# Patient Record
Sex: FEMALE | Race: BLACK OR AFRICAN AMERICAN | Employment: UNEMPLOYED | URBAN - METROPOLITAN AREA
[De-identification: names, ages, dates, MRNs, and addresses within clinical notes are randomized per-mention and may not be internally consistent; named-entity substitution may affect disease eponyms.]

---

## 2017-03-31 ENCOUNTER — TELEPHONE (OUTPATIENT)
Dept: FAMILY MEDICINE CLINIC | Facility: CLINIC | Age: 27
End: 2017-03-31

## 2022-06-02 ENCOUNTER — HOSPITAL ENCOUNTER (EMERGENCY)
Age: 32
Discharge: HOME | End: 2022-06-02
Attending: EMERGENCY MEDICINE
Payer: MEDICAID

## 2022-06-02 VITALS
OXYGEN SATURATION: 100 % | TEMPERATURE: 97.2 F | HEIGHT: 67 IN | BODY MASS INDEX: 30.55 KG/M2 | WEIGHT: 194.67 LBS | RESPIRATION RATE: 21 BRPM | DIASTOLIC BLOOD PRESSURE: 88 MMHG | SYSTOLIC BLOOD PRESSURE: 118 MMHG | HEART RATE: 84 BPM

## 2022-06-02 DIAGNOSIS — T78.40XA ALLERGIC REACTION, INITIAL ENCOUNTER: Primary | ICD-10-CM

## 2022-06-02 PROCEDURE — 6360000002 HC RX W HCPCS: Performed by: PHYSICIAN ASSISTANT

## 2022-06-02 PROCEDURE — 2500000003 HC RX 250 WO HCPCS: Performed by: PHYSICIAN ASSISTANT

## 2022-06-02 PROCEDURE — 99284 EMERGENCY DEPT VISIT MOD MDM: CPT

## 2022-06-02 PROCEDURE — 96372 THER/PROPH/DIAG INJ SC/IM: CPT

## 2022-06-02 PROCEDURE — 96375 TX/PRO/DX INJ NEW DRUG ADDON: CPT

## 2022-06-02 PROCEDURE — 2580000003 HC RX 258: Performed by: PHYSICIAN ASSISTANT

## 2022-06-02 PROCEDURE — 96374 THER/PROPH/DIAG INJ IV PUSH: CPT

## 2022-06-02 PROCEDURE — A4216 STERILE WATER/SALINE, 10 ML: HCPCS | Performed by: PHYSICIAN ASSISTANT

## 2022-06-02 RX ORDER — EPINEPHRINE 1 MG/ML
0.3 INJECTION, SOLUTION, CONCENTRATE INTRAVENOUS ONCE
Status: COMPLETED | OUTPATIENT
Start: 2022-06-02 | End: 2022-06-02

## 2022-06-02 RX ORDER — METHYLPREDNISOLONE SODIUM SUCCINATE 125 MG/2ML
125 INJECTION, POWDER, LYOPHILIZED, FOR SOLUTION INTRAMUSCULAR; INTRAVENOUS ONCE
Status: COMPLETED | OUTPATIENT
Start: 2022-06-02 | End: 2022-06-02

## 2022-06-02 RX ORDER — FAMOTIDINE 20 MG/1
20 TABLET, FILM COATED ORAL 2 TIMES DAILY
Qty: 10 TABLET | Refills: 0 | Status: SHIPPED | OUTPATIENT
Start: 2022-06-02 | End: 2022-08-03

## 2022-06-02 RX ORDER — ONDANSETRON 2 MG/ML
4 INJECTION INTRAMUSCULAR; INTRAVENOUS ONCE
Status: COMPLETED | OUTPATIENT
Start: 2022-06-02 | End: 2022-06-02

## 2022-06-02 RX ORDER — CETIRIZINE HYDROCHLORIDE 10 MG/1
10 TABLET ORAL DAILY
Qty: 30 TABLET | Refills: 0 | Status: SHIPPED | OUTPATIENT
Start: 2022-06-02 | End: 2022-07-02

## 2022-06-02 RX ORDER — EPINEPHRINE 0.3 MG/.3ML
0.3 INJECTION SUBCUTANEOUS ONCE
Qty: 0.3 ML | Refills: 0 | Status: SHIPPED | OUTPATIENT
Start: 2022-06-02 | End: 2022-08-03

## 2022-06-02 RX ORDER — METHYLPREDNISOLONE 4 MG/1
TABLET ORAL
Qty: 1 KIT | Refills: 0 | Status: SHIPPED | OUTPATIENT
Start: 2022-06-02 | End: 2022-06-08

## 2022-06-02 RX ORDER — DIPHENHYDRAMINE HYDROCHLORIDE 50 MG/ML
25 INJECTION INTRAMUSCULAR; INTRAVENOUS ONCE
Status: COMPLETED | OUTPATIENT
Start: 2022-06-02 | End: 2022-06-02

## 2022-06-02 RX ADMIN — METHYLPREDNISOLONE SODIUM SUCCINATE 125 MG: 125 INJECTION, POWDER, FOR SOLUTION INTRAMUSCULAR; INTRAVENOUS at 14:20

## 2022-06-02 RX ADMIN — FAMOTIDINE 20 MG: 10 INJECTION, SOLUTION INTRAVENOUS at 14:21

## 2022-06-02 RX ADMIN — EPINEPHRINE 0.3 MG: 1 INJECTION, SOLUTION, CONCENTRATE INTRAVENOUS at 14:36

## 2022-06-02 RX ADMIN — DIPHENHYDRAMINE HYDROCHLORIDE 25 MG: 50 INJECTION, SOLUTION INTRAMUSCULAR; INTRAVENOUS at 14:20

## 2022-06-02 RX ADMIN — ONDANSETRON 4 MG: 2 INJECTION INTRAMUSCULAR; INTRAVENOUS at 14:31

## 2022-06-02 ASSESSMENT — ENCOUNTER SYMPTOMS
CHEST TIGHTNESS: 0
SHORTNESS OF BREATH: 0
GASTROINTESTINAL NEGATIVE: 1
TROUBLE SWALLOWING: 1

## 2022-06-02 ASSESSMENT — PAIN - FUNCTIONAL ASSESSMENT: PAIN_FUNCTIONAL_ASSESSMENT: NONE - DENIES PAIN

## 2022-06-02 NOTE — ED NOTES
Patient was given discharge instructions and voices understanding. Patient is to return to ED with any concerning symptoms or needs.           Fabian Tabor RN  06/02/22 9287

## 2022-06-02 NOTE — ED PROVIDER NOTES
[address]  [address] [address]  Dept: [phone]  Loc: [phone]  eMERGENCYdEPARTMENT eNCOUnter      Pt Name: Nanci Byrd  MRN: [id number]  [place] 1990  Date of evaluation: 6/2/2022  Provider:Althea Myers PA-C    CHIEF COMPLAINT       Chief Complaint   Patient presents with    Allergic Reaction     pt states woke up itching all over body. noticed swelling/redness. states throat itching now. took teaspoon of benedryl with no relief. pt states no new medications/detergants or food        CRITICAL CARE TIME   Total Critical Care time was 0 minutes, excluding separately reportable procedures. There was a high probability of clinically significant/life threatening deterioration in the patient's condition which required my urgentintervention. HISTORY OF PRESENT ILLNESS  (Location/Symptom, Timing/Onset, Context/Setting, Quality, Duration,Modifying Factors, Severity.)   Nanci Byrd is a 28 y.o. female who presents to the emergency department by private vehicle complaining of allergic reaction. Patient states she woke up early this morning with generalized itching throughout her body. She states she is gradually developed redness and swelling to various portions of her body including forehead, arms/hands bilaterally, upper legs. She took oral Benadryl at home with no improvement. Redness/swelling/itching has continued to spread further and she has developed mild hoarseness to her voice and a discomfort with swallowing. Given those developing symptoms she sought evaluation in the ED. She denies any shortness of breath, chest pain/tightness, nausea, vomiting. Denies any history of allergic reactions previously. Denies any new medications, dietary changes, new/change in lotions/detergents/creams, environmental exposures. No other medical problems.         Nursing Notes were reviewedand agreed with or any disagreements were addressed in the HPI. REVIEW OF SYSTEMS    (2-9 systems for level 4, 10 or more for level 5)     Review of Systems   Constitutional: Negative for chills and fever. HENT: Positive for trouble swallowing. Respiratory: Negative for chest tightness and shortness of breath. Cardiovascular: Negative. Gastrointestinal: Negative. Genitourinary: Negative. Musculoskeletal: Negative for arthralgias and myalgias. Skin: Negative. Neurological: Negative. Except as noted above the remainder of the review of systems was reviewed and negative. PAST MEDICAL HISTORY         Diagnosis Date    Abnormal Pap smear 2011    LEEP done       SURGICAL HISTORY     History reviewed. No pertinent surgical history. CURRENT MEDICATIONS     [unfilled]    ALLERGIES     Patient has no known allergies. FAMILY HISTORY           Problem Relation Age of Onset    Arthritis Mother     Depression Mother     Miscarriages / María Christie Mother     Diabetes Mother     Mental Illness Brother     Cancer Maternal Aunt     Diabetes Maternal Aunt     Asthma Maternal Uncle     Mental Illness Maternal Uncle     Substance Abuse Maternal Uncle     Cancer Maternal Grandmother      Family Status   Relation Name Status    Mother  (Not Specified)    Brother  (Not Specified)    MAunt  (Not Specified)    MUnc  (Not Specified)    MGM  (Not Specified)        SOCIAL HISTORY      reports that she has been smoking cigars and cigarettes. She has been smoking about 0.25 packs per day. She has never used smokeless tobacco. She reports current alcohol use. She reports that she does not use drugs.     PHYSICAL EXAM    (up to 7 for level 4, 8 or more for level 5)     ED Triage Vitals [06/02/22 1328]   Enc Vitals Group      /83      Heart Rate 86      Resp 20      Temp 97.2 °F (36.2 °C)      Temp Source Oral      SpO2 99 %      Weight 194 lb 10.7 oz (88.3 kg)      Height 5' 7\" (1.702 m)      Head Circumference       Peak Flow       Pain Score       Pain Loc       Pain Edu? Excl. in [address]? Physical Exam  Vitals reviewed. Constitutional:       Appearance: Normal appearance. HENT:      Head: Normocephalic and atraumatic. Comments: Airway patent, tolerating oral secretions, no edema/erythema present to visible portion posterior oropharynx, no uvular/tongue edema/erythema, normal tongue protrusion, no stridor  Cardiovascular:      Rate and Rhythm: Normal rate and regular rhythm. Pulmonary:      Effort: Pulmonary effort is normal. No respiratory distress. Breath sounds: Normal breath sounds. Musculoskeletal:         General: Normal range of motion. Cervical back: Normal range of motion and neck supple. No rigidity. Lymphadenopathy:      Cervical: No cervical adenopathy. Skin:     General: Skin is warm. Neurological:      General: No focal deficit present. Mental Status: She is alert and oriented to person, place, and time. Psychiatric:         Mood and Affect: Mood normal.         Behavior: Behavior normal.           DIAGNOSTIC RESULTS     EKG: All EKG's are interpreted by the Emergency Department Physician who either signs or Co-signs this chart in the absence of a cardiologist.    RADIOLOGY:   Non-plain film images such as CT, Ultrasound and MRI are read by the radiologist. Plain radiographic images are visualized and preliminarilyinterpreted by the emergency physician with the below findings:    Interpretation per the Radiologist below,if available at the time of this note:    No orders to display         LABS:  Labs Reviewed - No data to display    All other labs were within normal range or not returned as of this dictation.     EMERGENCY DEPARTMENT COURSE and DIFFERENTIAL DIAGNOSIS/MDM:   Vitals:    Vitals:    06/02/22 1500 06/02/22 1515 06/02/22 1530 06/02/22 1600   BP: (!) 120/96 124/86 120/84 116/81   Pulse: 71 88 65 87   Resp: 21 25 16 22   Temp: TempSrc:       SpO2: 100% 97% 96% 100%   Weight:       Height:           MDM     Patient presents ED with HPI noted above. She is hemodynamically stable, afebrile and nontoxic-appearing. Oxygen saturation 99% on room air. History exam consistent with allergic reaction. Unable to discern source of reaction based on history. Patient no known allergies/previous allergic reactions. Physical exam as above. Patient tolerating oral secretions, no stridor, no visible intraoral erythema/edema, lungs clear, she is not tachycardic. She denies any nausea, vomiting. Given self-report of mild hoarseness and difficulty swallowing that has gradually developed we will treat more aggressively and monitor closely in the ED. She was given IV Solu-Medrol, Benadryl and Pepcid. Shortly after administration medications patient began with nausea, vomiting. Uncertain if this is side effect of medication at reevaluation or progression of allergic reaction. IV Zofran and IM epinephrine given. Patient observed in the ED for over 2 additional hours with gradual improvement of symptoms. No additional medications were given throughout remainder of stay. At this time patient states she is feeling better requesting be discharged home. Will discharge home with further medications for treatment of allergic reaction. She was discharged with prescription for an EpiPen as well. Uncertain of source of allergic reaction this time. Patient to follow-up with primary care doctor closely for reevaluation. Information for PCP care establishment provided time discharge. She is comfortable plan. She discharged home in stable condition. The patient tolerated their visit well. They were seen and evaluated by the attending physician, Dr. Trell Peña who agreed with the assessment and plan.   The patient and / or the family were informed of the results of any tests, a time was given to answer questions, a plan was proposed and they agreed

## 2022-06-02 NOTE — ED PROVIDER NOTES
[address]      Pt Name: Robel Woo  MRN: [id number]  [place] 1990  Date of evaluation: 6/2/2022  Provider: Brittany Rosado, [address] [place] [address]  Chief Complaint   Patient presents with    Allergic Reaction     pt states woke up itching all over body. noticed swelling/redness. states throat itching now. took teaspoon of benedryl with no relief. pt states no new medications/detergants or food        I have fully participated in the care of Robel Woo and have had a face-to-face evaluation. I have reviewed and agree with all pertinent clinical information, and midlevel provider's history, and physical exam. I have also reviewed the treatment plan. I have also reviewed and agree with the medications, allergies and past medical history section for this Robel Woo. I agree with the diagnosis, and I concur. I wore personal protective equipment when I was in the room the entire time. This includes gloves, N95 mask, face shield, and a glove over my stethoscope for protection. Past Medical History:   Diagnosis Date    Abnormal Pap smear 2011    LEEP done       MDM:  Robel Woo is a 28 y.o. female who presents with allergic reaction. She states she woke up with itching all over her body. She noticed swelling and redness. Later on she noticed her throat was itching and felt some fullness in her throat. Therefore, she came to the emergency department. She denies any new products or new medications. Nothing makes it better or worse. She describes it as moderate. She has never had a reaction like this in the past.  Physical exam reveals urticarial rash of her arms legs and trunk. Oropharynx is clear. Oropharynx is patent. Lungs are clear auscultation equal bilaterally. Patient had an IV started in the emergency department was given Solu-Medrol and then became nauseated after the Solu-Medrol.   She was also given Benadryl and epinephrine. The epinephrine seemed to clear her symptoms. She is feeling much better after 2 hours. She was wanting to go home. Therefore, she was discharged with prescriptions for Medrol Dosepak, Benadryl, and Pepcid. She was instructed to follow-up with her doctor for further testing such as allergy testing and that type of thing and return to the emergency department for any problems. Vitals:    06/02/22 1600   BP: 116/81   Pulse: 87   Resp: 22   Temp:    SpO2: 100%       See discharge instructions for specific medications, discharge information, and treatments. They were verbally instructed to return to emergency if any problems. Medications   famotidine (PEPCID) 20 mg in sodium chloride (PF) 10 mL injection (20 mg IntraVENous Given 6/2/22 1421)   diphenhydrAMINE (BENADRYL) injection 25 mg (25 mg IntraVENous Given 6/2/22 1420)   methylPREDNISolone sodium (SOLU-MEDROL) injection 125 mg (125 mg IntraVENous Given 6/2/22 1420)   EPINEPHrine PF 1 MG/ML injection 0.3 mg (0.3 mg IntraMUSCular Given 6/2/22 1436)   ondansetron (ZOFRAN) injection 4 mg (4 mg IntraVENous Given 6/2/22 1431)       New Prescriptions    No medications on file       The patient's blood pressure was found to be elevated according to CMS/Medicare and the Affordable Care Act/[facility] criteria. Elevated blood pressure could occur because of pain or anxiety or other reasons and does not mean that they need to have their blood pressure treated or medications otherwise adjusted. However, this could also be a sign that they will need to have their blood pressure treated or medications changed. The patient was instructed to follow up closely with their personal physician to have their blood pressure rechecked. The patient was instructed to take a list of recent blood pressure readings to their next visit with their personal physician. IMPRESSIONS:  1.  Allergic reaction, initial encounter                Serene Cano DO  06/02/22 1654

## 2022-06-02 NOTE — ED NOTES
PT has large swollen areas to left arm , Rt shoulder and forehead. Pt became nauseated and vomited after administering medications that were ordered. Denies any difficulty swallowing or breathing. Pt was placed on cardiac monitor, BP and pulse Ox.  Althea BEE notified and re-assessed pt at the bedside     Lesli Kellogg RN  06/02/22 [address], RN  06/02/22 7593

## 2022-06-04 ENCOUNTER — HOSPITAL ENCOUNTER (EMERGENCY)
Age: 32
Discharge: LEFT BEFORE DISCHARGE | End: 2022-06-04

## 2022-06-04 RX ORDER — DIPHENHYDRAMINE HYDROCHLORIDE 50 MG/ML
25 INJECTION INTRAMUSCULAR; INTRAVENOUS ONCE
Status: DISCONTINUED | OUTPATIENT
Start: 2022-06-04 | End: 2022-06-04

## 2022-06-04 RX ORDER — METHYLPREDNISOLONE SODIUM SUCCINATE 125 MG/2ML
125 INJECTION, POWDER, LYOPHILIZED, FOR SOLUTION INTRAMUSCULAR; INTRAVENOUS ONCE
Status: DISCONTINUED | OUTPATIENT
Start: 2022-06-04 | End: 2022-06-04

## 2022-06-05 ENCOUNTER — HOSPITAL ENCOUNTER (INPATIENT)
Age: 32
LOS: 1 days | Discharge: HOME | DRG: 916 | End: 2022-06-05
Attending: EMERGENCY MEDICINE | Admitting: FAMILY MEDICINE
Payer: MEDICAID

## 2022-06-05 VITALS
BODY MASS INDEX: 31.45 KG/M2 | TEMPERATURE: 98 F | RESPIRATION RATE: 16 BRPM | OXYGEN SATURATION: 100 % | HEIGHT: 67 IN | DIASTOLIC BLOOD PRESSURE: 69 MMHG | HEART RATE: 72 BPM | SYSTOLIC BLOOD PRESSURE: 106 MMHG | WEIGHT: 200.4 LBS

## 2022-06-05 DIAGNOSIS — T78.3XXA ANGIOEDEMA, INITIAL ENCOUNTER: Primary | ICD-10-CM

## 2022-06-05 LAB
ANION GAP SERPL CALCULATED.3IONS-SCNC: 13 MMOL/L (ref 3–16)
BASOPHILS ABSOLUTE: 0.1 K/UL (ref 0–0.2)
BASOPHILS RELATIVE PERCENT: 0.9 %
BUN BLDV-MCNC: 8 MG/DL (ref 7–20)
CALCIUM SERPL-MCNC: 9.2 MG/DL (ref 8.3–10.6)
CHLORIDE BLD-SCNC: 103 MMOL/L (ref 99–110)
CO2: 23 MMOL/L (ref 21–32)
CREAT SERPL-MCNC: 0.7 MG/DL (ref 0.6–1.1)
EOSINOPHILS ABSOLUTE: 0.1 K/UL (ref 0–0.6)
EOSINOPHILS RELATIVE PERCENT: 1 %
GFR AFRICAN AMERICAN: >60
GFR NON-AFRICAN AMERICAN: >60
GLUCOSE BLD-MCNC: 160 MG/DL (ref 70–99)
GLUCOSE BLD-MCNC: 97 MG/DL (ref 70–99)
HCG QUALITATIVE: NEGATIVE
HCT VFR BLD CALC: 42.8 % (ref 36–48)
HEMOGLOBIN: 14.1 G/DL (ref 12–16)
LYMPHOCYTES ABSOLUTE: 4.5 K/UL (ref 1–5.1)
LYMPHOCYTES RELATIVE PERCENT: 36.9 %
MCH RBC QN AUTO: 31.2 PG (ref 26–34)
MCHC RBC AUTO-ENTMCNC: 32.9 G/DL (ref 31–36)
MCV RBC AUTO: 94.6 FL (ref 80–100)
MONOCYTES ABSOLUTE: 0.7 K/UL (ref 0–1.3)
MONOCYTES RELATIVE PERCENT: 6.1 %
NEUTROPHILS ABSOLUTE: 6.7 K/UL (ref 1.7–7.7)
NEUTROPHILS RELATIVE PERCENT: 55.1 %
PDW BLD-RTO: 14 % (ref 12.4–15.4)
PERFORMED ON: ABNORMAL
PLATELET # BLD: 293 K/UL (ref 135–450)
PMV BLD AUTO: 7.7 FL (ref 5–10.5)
POTASSIUM REFLEX MAGNESIUM: 3.7 MMOL/L (ref 3.5–5.1)
RBC # BLD: 4.53 M/UL (ref 4–5.2)
SODIUM BLD-SCNC: 139 MMOL/L (ref 136–145)
WBC # BLD: 12.2 K/UL (ref 4–11)

## 2022-06-05 PROCEDURE — 94760 N-INVAS EAR/PLS OXIMETRY 1: CPT

## 2022-06-05 PROCEDURE — 36415 COLL VENOUS BLD VENIPUNCTURE: CPT

## 2022-06-05 PROCEDURE — 85025 COMPLETE CBC W/AUTO DIFF WBC: CPT

## 2022-06-05 PROCEDURE — 99285 EMERGENCY DEPT VISIT HI MDM: CPT

## 2022-06-05 PROCEDURE — 84703 CHORIONIC GONADOTROPIN ASSAY: CPT

## 2022-06-05 PROCEDURE — 2060000000 HC ICU INTERMEDIATE R&B

## 2022-06-05 PROCEDURE — 96372 THER/PROPH/DIAG INJ SC/IM: CPT

## 2022-06-05 PROCEDURE — 96374 THER/PROPH/DIAG INJ IV PUSH: CPT

## 2022-06-05 PROCEDURE — 6360000002 HC RX W HCPCS: Performed by: EMERGENCY MEDICINE

## 2022-06-05 PROCEDURE — 6360000002 HC RX W HCPCS: Performed by: STUDENT IN AN ORGANIZED HEALTH CARE EDUCATION/TRAINING PROGRAM

## 2022-06-05 PROCEDURE — 80048 BASIC METABOLIC PNL TOTAL CA: CPT

## 2022-06-05 PROCEDURE — 2580000003 HC RX 258: Performed by: STUDENT IN AN ORGANIZED HEALTH CARE EDUCATION/TRAINING PROGRAM

## 2022-06-05 PROCEDURE — 96375 TX/PRO/DX INJ NEW DRUG ADDON: CPT

## 2022-06-05 RX ORDER — DEXAMETHASONE SODIUM PHOSPHATE 10 MG/ML
6 INJECTION, SOLUTION INTRAMUSCULAR; INTRAVENOUS EVERY 6 HOURS
Status: DISCONTINUED | OUTPATIENT
Start: 2022-06-05 | End: 2022-06-05 | Stop reason: HOSPADM

## 2022-06-05 RX ORDER — DIPHENHYDRAMINE HYDROCHLORIDE 50 MG/ML
50 INJECTION INTRAMUSCULAR; INTRAVENOUS ONCE
Status: COMPLETED | OUTPATIENT
Start: 2022-06-05 | End: 2022-06-05

## 2022-06-05 RX ORDER — SODIUM CHLORIDE 0.9 % (FLUSH) 0.9 %
5-40 SYRINGE (ML) INJECTION PRN
Status: DISCONTINUED | OUTPATIENT
Start: 2022-06-05 | End: 2022-06-05 | Stop reason: HOSPADM

## 2022-06-05 RX ORDER — ACETAMINOPHEN 325 MG/1
650 TABLET ORAL EVERY 6 HOURS PRN
Status: DISCONTINUED | OUTPATIENT
Start: 2022-06-05 | End: 2022-06-05 | Stop reason: HOSPADM

## 2022-06-05 RX ORDER — METHYLPREDNISOLONE SODIUM SUCCINATE 125 MG/2ML
125 INJECTION, POWDER, LYOPHILIZED, FOR SOLUTION INTRAMUSCULAR; INTRAVENOUS ONCE
Status: COMPLETED | OUTPATIENT
Start: 2022-06-05 | End: 2022-06-05

## 2022-06-05 RX ORDER — EPINEPHRINE 1 MG/ML
0.3 INJECTION, SOLUTION, CONCENTRATE INTRAVENOUS ONCE
Status: COMPLETED | OUTPATIENT
Start: 2022-06-05 | End: 2022-06-05

## 2022-06-05 RX ORDER — ACETAMINOPHEN 650 MG/1
650 SUPPOSITORY RECTAL EVERY 6 HOURS PRN
Status: DISCONTINUED | OUTPATIENT
Start: 2022-06-05 | End: 2022-06-05 | Stop reason: HOSPADM

## 2022-06-05 RX ORDER — SODIUM CHLORIDE 0.9 % (FLUSH) 0.9 %
5-40 SYRINGE (ML) INJECTION EVERY 12 HOURS SCHEDULED
Status: DISCONTINUED | OUTPATIENT
Start: 2022-06-05 | End: 2022-06-05 | Stop reason: HOSPADM

## 2022-06-05 RX ORDER — ENOXAPARIN SODIUM 100 MG/ML
40 INJECTION SUBCUTANEOUS DAILY
Status: DISCONTINUED | OUTPATIENT
Start: 2022-06-05 | End: 2022-06-05 | Stop reason: HOSPADM

## 2022-06-05 RX ORDER — POLYETHYLENE GLYCOL 3350 17 G/17G
17 POWDER, FOR SOLUTION ORAL DAILY PRN
Status: DISCONTINUED | OUTPATIENT
Start: 2022-06-05 | End: 2022-06-05 | Stop reason: HOSPADM

## 2022-06-05 RX ORDER — ONDANSETRON 2 MG/ML
4 INJECTION INTRAMUSCULAR; INTRAVENOUS EVERY 6 HOURS PRN
Status: DISCONTINUED | OUTPATIENT
Start: 2022-06-05 | End: 2022-06-05 | Stop reason: HOSPADM

## 2022-06-05 RX ORDER — ONDANSETRON 4 MG/1
4 TABLET, ORALLY DISINTEGRATING ORAL EVERY 8 HOURS PRN
Status: DISCONTINUED | OUTPATIENT
Start: 2022-06-05 | End: 2022-06-05 | Stop reason: HOSPADM

## 2022-06-05 RX ORDER — SODIUM CHLORIDE 9 MG/ML
INJECTION, SOLUTION INTRAVENOUS PRN
Status: DISCONTINUED | OUTPATIENT
Start: 2022-06-05 | End: 2022-06-05 | Stop reason: HOSPADM

## 2022-06-05 RX ADMIN — DIPHENHYDRAMINE HYDROCHLORIDE 50 MG: 50 INJECTION, SOLUTION INTRAMUSCULAR; INTRAVENOUS at 02:30

## 2022-06-05 RX ADMIN — EPINEPHRINE 0.3 MG: 1 INJECTION, SOLUTION, CONCENTRATE INTRAVENOUS at 02:26

## 2022-06-05 RX ADMIN — METHYLPREDNISOLONE SODIUM SUCCINATE 125 MG: 125 INJECTION, POWDER, FOR SOLUTION INTRAMUSCULAR; INTRAVENOUS at 02:28

## 2022-06-05 RX ADMIN — DEXAMETHASONE SODIUM PHOSPHATE 6 MG: 10 INJECTION, SOLUTION INTRAMUSCULAR; INTRAVENOUS at 16:08

## 2022-06-05 RX ADMIN — Medication 10 ML: at 12:00

## 2022-06-05 RX ADMIN — DEXAMETHASONE SODIUM PHOSPHATE 6 MG: 10 INJECTION, SOLUTION INTRAMUSCULAR; INTRAVENOUS at 11:58

## 2022-06-05 ASSESSMENT — PAIN SCALES - GENERAL
PAINLEVEL_OUTOF10: 0
PAINLEVEL_OUTOF10: 0
PAINLEVEL_OUTOF10: 6

## 2022-06-05 ASSESSMENT — LIFESTYLE VARIABLES
HOW OFTEN DO YOU HAVE A DRINK CONTAINING ALCOHOL: 2-3 TIMES A WEEK
HOW MANY STANDARD DRINKS CONTAINING ALCOHOL DO YOU HAVE ON A TYPICAL DAY: 1 OR 2

## 2022-06-05 ASSESSMENT — PAIN DESCRIPTION - ONSET: ONSET: ON-GOING

## 2022-06-05 ASSESSMENT — PAIN DESCRIPTION - DESCRIPTORS: DESCRIPTORS: TIGHTNESS

## 2022-06-05 ASSESSMENT — PAIN DESCRIPTION - LOCATION: LOCATION: MOUTH

## 2022-06-05 ASSESSMENT — PAIN DESCRIPTION - PAIN TYPE: TYPE: ACUTE PAIN

## 2022-06-05 ASSESSMENT — PAIN - FUNCTIONAL ASSESSMENT: PAIN_FUNCTIONAL_ASSESSMENT: ACTIVITIES ARE NOT PREVENTED

## 2022-06-05 ASSESSMENT — PAIN DESCRIPTION - ORIENTATION: ORIENTATION: UPPER

## 2022-06-05 NOTE — ED PROVIDER NOTES
EMERGENCY DEPARTMENT PROVIDER NOTE    Patient Identification  Pt Name: David Garay  MRN: [id number]  [place] 1990  Date of evaluation: 6/5/2022  Provider: Joshua Land DO  PCP: No primary care provider on file. Chief Complaint  Allergic Reaction      HPI  (History provided by patient)  This is a 28 y.o. female who was brought in by family for upper and lower lip swelling which began about 2 hours prior to arrival.  Nothing seems to acutely make the symptoms any better or worse. Associated with lip tightness and pain. Patient was seen in this emergency department on 6/2/2022 for allergic reaction consistent with urticaria, unclear trigger. She was treated with steroids and reported improvement. She had no lip swelling at that time. She has no known allergies. Current symptoms are associated with voice hoarseness, sore throat and sensation of throat tightness. ROS    Const:  No fevers, no chills, no generalized weakness  Skin:  No rash, no lesions  Eyes:  No visual changes, no blurry or double vision, no pain  ENT:  +sore throat, no difficulty swallowing, no ear pain, no sinus pain or congestion, +lip swelling  Card:  No chest pain, no palpitations, no edema  Resp:  No shortness of breath, no cough, no wheezing  Abd:  No abdominal pain, no nausea, no vomiting, no diarrhea  Genitourinary:  No dysuria, no hematuria, no vaginal discharge, no vaginal bleeding  MSK:  No joint pain, no myalgia  Neuro:  No focal weakness, no headache, no paresthesia    All other systems reviewed and negative unless otherwise noted in HPI      I have reviewed the following nursing documentation:  Allergies: Patient has no known allergies. Past medical history:   Past Medical History:   Diagnosis Date    Abnormal Pap smear 2011    LEEP done     Past surgical history: History reviewed. No pertinent surgical history.     Home medications:   Current Discharge Medication List      CONTINUE these medications which have NOT CHANGED    Details   EPINEPHrine (EPIPEN 2-MORRIS) 0.3 MG/0.3ML SOAJ injection Inject 0.3 mLs into the muscle once for 1 dose Use as directed for allergic reaction  Qty: 0.3 mL, Refills: 0      cetirizine (ZYRTEC) 10 MG tablet Take 1 tablet by mouth daily  Qty: 30 tablet, Refills: 0      methylPREDNISolone (MEDROL, MORRIS,) 4 MG tablet Take by mouth. Qty: 1 kit, Refills: 0      famotidine (PEPCID) 20 MG tablet Take 1 tablet by mouth 2 times daily for 5 days  Qty: 10 tablet, Refills: 0      ondansetron (ZOFRAN ODT) 4 MG disintegrating tablet Take 1 tablet by mouth every 8 hours as needed for Nausea  Qty: 20 tablet, Refills: 0      dicyclomine (BENTYL) 10 MG capsule Take 1 capsule by mouth every 6 hours as needed (cramps)  Qty: 20 capsule, Refills: 0             Social history:  reports that she has been smoking cigars and cigarettes. She has been smoking about 0.25 packs per day. She has never used smokeless tobacco. She reports current alcohol use. She reports that she does not use drugs. Family history:    Family History   Problem Relation Age of Onset    Arthritis Mother     Depression Mother    [de-identified] / Stillbirths Mother     Diabetes Mother     Mental Illness Brother     Cancer Maternal Aunt     Diabetes Maternal Aunt     Asthma Maternal Uncle     Mental Illness Maternal Uncle     Substance Abuse Maternal Uncle     Cancer Maternal Grandmother          Exam  ED Triage Vitals   BP Temp Temp Source Heart Rate Resp SpO2 Height Weight   06/05/22 0206 06/05/22 0206 06/05/22 0715 06/05/22 0206 06/05/22 0206 06/05/22 0206 06/05/22 0206 06/05/22 0206   126/86 98.3 °F (36.8 °C) Oral 85 16 99 % 5' 7\" (1.702 m) 195 lb 5.2 oz (88.6 kg)     Nursing note and vitals reviewed. Constitutional: Well developed, well nourished. Non-toxic in appearance. HENT:      Head: Normocephalic and atraumatic. Ears: External ears normal.      Nose: Nose normal.     Mouth: Membrane mucosa moist and pink.   Pronounced edema of both upper and lower lips bilaterally, no tongue edema. No obvious edema posterior oropharynx, uvula midline. Speech is mildly hoarse. Tolerating oral secretions. Eyes: Anicteric sclera. No discharge. Neck: Supple. Trachea midline. Cardiovascular: RRR; no murmurs, rubs, or gallops. Pulmonary/Chest: Effort normal. No respiratory distress. CTAB. No stridor. No wheezes. No rales. Abdominal: Soft. No distension. Nontender to deep palpation all quadrants. Musculoskeletal: Moves all extremities. No gross deformity. Neurological: Alert and oriented. Face symmetric. Speech is clear. Skin: Warm and dry. No rash. Psychiatric: Normal mood and affect.  Behavior is normal.        Radiology  No orders to display       Labs  Results for orders placed or performed during the hospital encounter of 06/05/22   CBC with Auto Differential   Result Value Ref Range    WBC 12.2 (H) 4.0 - 11.0 K/uL    RBC 4.53 4.00 - 5.20 M/uL    Hemoglobin 14.1 12.0 - 16.0 g/dL    Hematocrit 42.8 36.0 - 48.0 %    MCV 94.6 80.0 - 100.0 fL    MCH 31.2 26.0 - 34.0 pg    MCHC 32.9 31.0 - 36.0 g/dL    RDW 14.0 12.4 - 15.4 %    Platelets 581 385 - 664 K/uL    MPV 7.7 5.0 - 10.5 fL    Neutrophils % 55.1 %    Lymphocytes % 36.9 %    Monocytes % 6.1 %    Eosinophils % 1.0 %    Basophils % 0.9 %    Neutrophils Absolute 6.7 1.7 - 7.7 K/uL    Lymphocytes Absolute 4.5 1.0 - 5.1 K/uL    Monocytes Absolute 0.7 0.0 - 1.3 K/uL    Eosinophils Absolute 0.1 0.0 - 0.6 K/uL    Basophils Absolute 0.1 0.0 - 0.2 K/uL   Basic Metabolic Panel w/ Reflex to MG   Result Value Ref Range    Sodium 139 136 - 145 mmol/L    Potassium reflex Magnesium 3.7 3.5 - 5.1 mmol/L    Chloride 103 99 - 110 mmol/L    CO2 23 21 - 32 mmol/L    Anion Gap 13 3 - 16    Glucose 97 70 - 99 mg/dL    BUN 8 7 - 20 mg/dL    CREATININE 0.7 0.6 - 1.1 mg/dL    GFR Non-African American >60 >60    GFR African American >60 >60    Calcium 9.2 8.3 - 10.6 mg/dL   HCG Qualitative, Serum   Result Value Ref Range    hCG Qual Negative Detects HCG level >10 MIU/mL       Screenings   [place] Coma Scale  Eye Opening: Spontaneous  Best Verbal Response: Oriented  Best Motor Response: Obeys commands  [place] Coma Scale Score: 15       Is this patient to be included in the SEP-1 Core Measure due to severe sepsis or septic shock? No   Exclusion criteria - the patient is NOT to be included for SEP-1 Core Measure due to: Infection is not suspected      MDM and ED Course    Patient afebrile and nontoxic. No distress. No hypoxia or increased work of breathing. Presentation consistent with angioedema, pronounced swelling of both lips, however no obvious tongue edema or pharyngeal edema. Patient does have mild voice hoarseness. Given concern for potential impending upper airway obstruction, patient was treated with epinephrine as well as Solu-Medrol, Pepcid and Benadryl. Unknown trigger. No evidence of infectious etiology, patient is not septic. Labs without acute endorgan dysfunction or clinically significant electrolyte derangement. I frequently reevaluated patient at bedside, her symptoms appear nonprogressive however mild voice hoarseness did remain. She is conversational and tolerating oral secretions. We will continue close airway monitoring however I do not feel that patient requires emergent intubation for airway protection at this time. Given worsening allergic symptoms with worsening reaction despite outpatient steroids, new angioedema, recommend admission for further evaluation and care and close airway monitoring. Case discussed with internal medicine team who will admit. At time of admission, patient reported her symptoms were mildly improved, she remained without clinical evidence of upper airway obstruction. Final Impression  1. Angioedema, initial encounter        Blood pressure 115/81, pulse 70, temperature 98 °F (36.7 °C), temperature source Oral, resp.  rate 17, height 5' 7\" (1.702 m), weight 195 lb 5.2 oz (88.6 kg), last menstrual period 05/19/2022, SpO2 96 %, not currently breastfeeding. Disposition:  DISPOSITION Admitted 06/05/2022 05:19:22 AM      Patient Referrals:  No follow-up provider specified. Discharge Medications:  Current Discharge Medication List          Discontinued Medications:  Current Discharge Medication List          This chart was generated using the [address] [place] [address] [address]  X2TVation system. I created this record but it may contain dictation errors given the limitations of this technology.     Fara Le DO (electronically signed)  Attending Emergency Physician       Fara Le DO  06/05/22 6607

## 2022-06-05 NOTE — PROGRESS NOTES
Pharmacy Medication Reconciliation Note     List of medications patient is currently taking is complete. Source of information:   1. Patient  2.  EMR      Pascual Romero PharmD, BCPS  6/5/2022  11:20 AM

## 2022-06-05 NOTE — ED NOTES
Pt called to [facility]- pt did not respond to call. Will call again.       Norma Mcmahan RN  06/04/22 2026

## 2022-06-05 NOTE — PROGRESS NOTES
Pt had reported difficulty swallowing while in ED. Pt states it has improved. Per Dr. Poly benton to advance to regular diet and monitor for difficulties. Pt stated she was able to eat pizza, broccoli, and a brownie with no swallowing difficulties.   Electronically signed by Ailin Rocha RN on 6/5/22 at 2:32 PM EDT

## 2022-06-05 NOTE — ED TRIAGE NOTES
Was seen on Thursday for allergic reaction systemically and prescribed several meds, here tonight for severe lip swelling

## 2022-06-05 NOTE — PROGRESS NOTES
Pt discharged with boyfriend to home. Discharge instructions including resuming previously ordered meds from Thursday ED's visit and waiting until tomorrow to resume steroids reviewed with pt. Pt instructed to f/u with her PCP- pt states she has appt on Thursday. Pt's lips noted to have decreased in swelling since admission this morning. Pt voiced understanding of all instructions. Iv and Telemetry removed prior to discharge.   Electronically signed by Boby Blackburn RN on 6/5/22 at 4:28 PM EDT

## 2022-06-05 NOTE — PLAN OF CARE
Problem: Discharge Planning  Goal: Discharge to home or other facility with appropriate resources  Outcome: Progressing  Flowsheets (Taken 6/5/2022 5789)  Discharge to home or other facility with appropriate resources: Identify barriers to discharge with patient and caregiver     Problem: Pain  Goal: Verbalizes/displays adequate comfort level or baseline comfort level  Outcome: Progressing

## 2022-06-05 NOTE — H&P
Hospital Medicine History & Physical      PCP: No primary care provider on file. Date of Admission: 6/5/2022    Date of Service: 6/5/2022    Chief Complaint:  Lip and tongue swelling    History Of Present Illness: The patient is a 28 y.o. female who presents to [facility] with worsening lip swelling and tongue fullness. Presented to the ED yesterday with diffuse bilateral body swelling and was given Medrol dose pack, benadryl, pepcid. Patient took a few doses of the medicine at home and noticed improvement in body swelling but lip and tongue swelling became worse so she presented to the ED. No new medications, foods, environmental exposures. Did go swimming yesterday in outdoor pool, no cold exposures. In the ED, vital signs and labs were unremarkable. Medicine called for further observation. Past Medical History:        Diagnosis Date    Abnormal Pap smear 2011    LEEP done       Past Surgical History:    History reviewed. No pertinent surgical history. Medications Prior to Admission:    Prior to Admission medications    Medication Sig Start Date End Date Taking? Authorizing Provider   EPINEPHrine (EPIPEN 2-MORRIS) 0.3 MG/0.3ML SOAJ injection Inject 0.3 mLs into the muscle once for 1 dose Use as directed for allergic reaction 6/2/22 6/2/22  Althea Myers PA-C   cetirizine (ZYRTEC) 10 MG tablet Take 1 tablet by mouth daily 6/2/22 7/2/22  Althea Myers PA-C   methylPREDNISolone (MEDROL, MORRIS,) 4 MG tablet Take by mouth.  6/2/22 6/8/22  Althea Myers PA-C   famotidine (PEPCID) 20 MG tablet Take 1 tablet by mouth 2 times daily for 5 days 6/2/22 6/7/22  Althea Myers PA-C   ondansetron (ZOFRAN ODT) 4 MG disintegrating tablet Take 1 tablet by mouth every 8 hours as needed for Nausea 6/12/18   JESUS Berger CNP   dicyclomine (BENTYL) 10 MG capsule Take 1 capsule by mouth every 6 hours as needed (cramps) 6/12/18   JESUS Danielle - CNP       Allergies:  Patient has no known allergies. Social History:  The patient currently lives at home    TOBACCO:   reports that she has been smoking cigars and cigarettes. She has been smoking about 0.25 packs per day. She has never used smokeless tobacco.  ETOH:   reports current alcohol use. Family History:  Reviewed in detail and negative for DM, Early CAD, Cancer, CVA. Positive as follows:        Problem Relation Age of Onset    Arthritis Mother     Depression Mother     Miscarriages / [place] Mother     Diabetes Mother     Mental Illness Brother     Cancer Maternal Aunt     Diabetes Maternal Aunt     Asthma Maternal Uncle     Mental Illness Maternal Uncle     Substance Abuse Maternal Uncle     Cancer Maternal Grandmother        REVIEW OF SYSTEMS:   Positive for lip and tongue swelling and as noted in the HPI. All other systems reviewed and negative. PHYSICAL EXAM:    /75   Pulse 68   Temp 98.4 °F (36.9 °C) (Oral)   Resp 17   Ht 5' 7\" (1.702 m)   Wt 195 lb 5.2 oz (88.6 kg)   LMP 05/19/2022   SpO2 100%   BMI 30.59 kg/m²     General appearance: No apparent distress appears stated age and cooperative. HEENT Normal cephalic, atraumatic without obvious deformity. Pupils equal, round, and reactive to light. Extra ocular muscles intact. Conjunctivae/corneas clear. Moderate lip swelling and mild tongue swelling. Examed several hours after initlal medication given in the ED  Neck: Supple, No jugular venous distention/bruits. Trachea midline without thyromegaly or adenopathy with full range of motion. Lungs: Clear to auscultation, bilaterally without Rales/Wheezes/Rhonchi with good respiratory effort.   Heart: Regular rate and rhythm with Normal S1/S2 without murmurs, rubs or gallops, point of maximum impulse non-displaced  Abdomen: Soft, non-tender or non-distended without rigidity or guarding and positive bowel sounds all four quadrants. Extremities: No clubbing, cyanosis, or edema bilaterally. Full range of motion without deformity and normal gait intact. Skin: Skin color, texture, turgor normal.  No rashes or lesions. Neurologic: Alert and oriented X 3, neurovascularly intact with sensory/motor intact upper extremities/lower extremities, bilaterally. Cranial nerves: II-XII intact, grossly non-focal.  Mental status: Alert, oriented, thought content appropriate. Capillary Refill: Acceptable  < 3 seconds  Peripheral Pulses: +3 Easily felt, not easily obliterated with pressure      CBC   Recent Labs     06/05/22 0226   WBC 12.2*   HGB 14.1   HCT 42.8         RENAL  Recent Labs     06/05/22 0226      K 3.7      CO2 23   BUN 8   CREATININE 0.7     LFT'S  No results for input(s): AST, ALT, ALB, BILIDIR, BILITOT, ALKPHOS in the last 72 hours. COAG  No results for input(s): INR in the last 72 hours. CARDIAC ENZYMES  No results for input(s): CKTOTAL, CKMB, CKMBINDEX, TROPONINI in the last 72 hours. U/A:    Lab Results   Component Value Date    COLORU DK YELLOW 06/12/2018    WBCUA 46 06/12/2018    RBCUA 1 06/12/2018    BACTERIA 4+ 06/12/2018    CLARITYU CLOUDY 06/12/2018    SPECGRAV 1.028 06/12/2018    LEUKOCYTESUR MODERATE 06/12/2018    BLOODU Negative 06/12/2018    GLUCOSEU Negative 06/12/2018    GLUCOSEU NEGATIVE 07/01/2011    AMORPHOUS 3+ 10/31/2010       ABG  No results found for: AID9BNY, BEART, R5FDMXFJ, PHART, THGBART, MLT4SEP, PO2ART, KZT6MME        Active Hospital Problems    Diagnosis Date Noted    Angioedema [T78. 3XXA] 06/05/2022     Priority: Medium    Allergic angioedema [T78. 3XXA] 06/05/2022     Priority: Medium         PHYSICIANS CERTIFICATION:    I certify that Tracey  is expected to be hospitalized for less than 2 midnights based on the following assessment and plan:      ASSESSMENT/PLAN:    Angioedema  Allergic reaction  - cause uncertain:  Does not appear to have recurrent allergic symptoms. Less likely to be hereditary. No urticaria per history or on exam.  - at this time does appear to be protecting airway  - not responsive to PO steroids  - initiate Methylprednisolone IV  - pepcid and benadryl IV PRN if unable to tolerate PO  - advance diet per nursing, if there are concerns, consult speech  - recommend outpatient allergy referral/testing if recurrent    DVT Prophylaxis: lovenox  Diet: No diet orders on file  Code Status: Prior  PT/OT Eval Status: n/a    Dispo - PCU       Perla Garcia MD    Thank you No primary care provider on file. for the opportunity to be involved in this patient's care. If you have any questions or concerns please feel free to contact me at [phone].

## 2022-06-05 NOTE — ED NOTES
ED SBAR report provider to Moira Bermudez. Patient to be transported to Room 5275 via stretcher by ED tech. Patient transported with bedside cardiac monitor and with IV medications infusing. IV site clean, dry, and intact. MEWS score and pain assessed as 0/10 and documented. Patient's score on the DUFF Fall scale reviewed with receiving RN. Updated patient and family on plan of care.        Sujata Robbins RN  06/05/22 7226

## 2022-06-05 NOTE — PROGRESS NOTES
Pt admitted to [address] [place] [address]. Pt alert and oriented. VS and assessment completed as allowed. Pt noted to significant lip edema especially the upper lip. Per pt photos, edema has decreased some since yesterday. Pt oriented to room and safety measures. Pt resting in bed with call light within reach. Telemetry confirmed with [facility].   Electronically signed by Gerry El RN on 6/5/22 at 2:36 PM EDT

## 2022-06-08 NOTE — DISCHARGE SUMMARY
Hospital Medicine Discharge Summary    Patient ID: Clementina Mcmahon      Patient's PCP: No primary care provider on file. Admit Date: 6/5/2022     Discharge Date: 6/5/2022      Admitting Physician: Alvaro Chaudhari MD     Discharge Physician: Alvaro Chaudhari MD     Discharge Diagnoses: Active Hospital Problems    Diagnosis Date Noted    Angioedema [T78. 3XXA] 06/05/2022     Priority: Medium    Allergic angioedema [T78. 3XXA] 06/05/2022     Priority: Medium       The patient was seen and examined on day of discharge and this discharge summary is in conjunction with any daily progress note from day of discharge. Hospital Course:     Angioedema  Allergic reaction  - cause uncertain:  Does not appear to have recurrent allergic symptoms. Less likely to be hereditary. No urticaria per history or on exam.  - at this time does appear to be protecting airway  - not responsive to PO steroids  - initiate Methylprednisolone IV  - pepcid and benadryl IV PRN if unable to tolerate PO  - advance diet per nursing, if there are concerns, consult speech  - recommend outpatient allergy referral/testing if recurrent      Exam:     /69   Pulse 72   Temp 98 °F (36.7 °C) (Oral)   Resp 16   Ht 5' 7\" (1.702 m)   Wt 200 lb 6.4 oz (90.9 kg)   LMP 05/19/2022   SpO2 100%   BMI 31.39 kg/m²     General appearance: No apparent distress, appears stated age and cooperative. HEENT: Pupils equal, round, and reactive to light. Conjunctivae/corneas clear. Neck: Supple, with full range of motion. No jugular venous distention. Trachea midline. Respiratory:  Normal respiratory effort. Clear to auscultation, bilaterally without Rales/Wheezes/Rhonchi. Cardiovascular: Regular rate and rhythm with normal S1/S2 without murmurs, rubs or gallops. Abdomen: Soft, non-tender, non-distended with normal bowel sounds. Musculoskelatal: No clubbing, cyanosis or edema bilaterally. Full range of motion without deformity.   Skin: Skin color, texture, turgor normal.  No rashes or lesions. Neurologic:  Neurovascularly intact without any focal sensory/motor deficits. Cranial nerves: II-XII intact, grossly non-focal.  Psychiatric: Alert and oriented, thought content appropriate, normal insight      Consults:     IP CONSULT TO HOSPITALIST    Significant Diagnostic Studies:   Routine labs    PCP/SNF to follow up: recommend outpatient allergy referral/testing if recurrent    Disposition:  Home    Condition on D/C:  Stable     Discharge Instructions/Follow-up:  F/u with PCP within 1 week    Code Status:  Prior     Activity: activity as tolerated    Diet: regular diet    Labs: For convenience and continuity at follow-up the following most recent labs are provided:      CBC:    Lab Results   Component Value Date    WBC 12.2 06/05/2022    HGB 14.1 06/05/2022    HCT 42.8 06/05/2022     06/05/2022       Renal:    Lab Results   Component Value Date     06/05/2022    K 3.7 06/05/2022     06/05/2022    CO2 23 06/05/2022    BUN 8 06/05/2022    CREATININE 0.7 06/05/2022    CALCIUM 9.2 06/05/2022       Discharge Medications:     Discharge Medication List as of 6/5/2022  4:02 PM           Details   EPINEPHrine (EPIPEN 2-MORRIS) 0.3 MG/0.3ML SOAJ injection Inject 0.3 mLs into the muscle once for 1 dose Use as directed for allergic reaction, Disp-0.3 mL, R-0Print      cetirizine (ZYRTEC) 10 MG tablet Take 1 tablet by mouth daily, Disp-30 tablet, R-0Print      methylPREDNISolone (MEDROL, MORRIS,) 4 MG tablet Take by mouth., Disp-1 kit, R-0Print      famotidine (PEPCID) 20 MG tablet Take 1 tablet by mouth 2 times daily for 5 days, Disp-10 tablet, R-0Print             Time Spent on discharge is more than 20 minutes in the examination, evaluation, counseling and review of medications and discharge plan. Signed: Jimmy Locke MD   6/8/2022      Thank you No primary care provider on file. for the opportunity to be involved in this patient's care.  If you have any questions or concerns please feel free to contact me at [phone].

## 2022-08-03 ENCOUNTER — HOSPITAL ENCOUNTER (INPATIENT)
Age: 32
LOS: 1 days | Discharge: HOME | DRG: 916 | End: 2022-08-04
Attending: EMERGENCY MEDICINE | Admitting: INTERNAL MEDICINE
Payer: MEDICAID

## 2022-08-03 DIAGNOSIS — T78.3XXA ANGIOEDEMA, INITIAL ENCOUNTER: Primary | ICD-10-CM

## 2022-08-03 DIAGNOSIS — T78.3XXA ANGIOEDEMA OF LIPS, INITIAL ENCOUNTER: ICD-10-CM

## 2022-08-03 LAB
A/G RATIO: 1.4 (ref 1.1–2.2)
ALBUMIN SERPL-MCNC: 4.2 G/DL (ref 3.4–5)
ALP BLD-CCNC: 71 U/L (ref 40–129)
ALT SERPL-CCNC: 12 U/L (ref 10–40)
ANION GAP SERPL CALCULATED.3IONS-SCNC: 9 MMOL/L (ref 3–16)
AST SERPL-CCNC: 22 U/L (ref 15–37)
BASOPHILS ABSOLUTE: 0 K/UL (ref 0–0.2)
BASOPHILS RELATIVE PERCENT: 0 %
BILIRUB SERPL-MCNC: 1.2 MG/DL (ref 0–1)
BUN BLDV-MCNC: 8 MG/DL (ref 7–20)
C-REACTIVE PROTEIN: 8.4 MG/L (ref 0–5.1)
C3 COMPLEMENT: 105.1 MG/DL (ref 90–180)
C4 COMPLEMENT: 22.3 MG/DL (ref 10–40)
CALCIUM SERPL-MCNC: 9.5 MG/DL (ref 8.3–10.6)
CHLORIDE BLD-SCNC: 102 MMOL/L (ref 99–110)
CO2: 24 MMOL/L (ref 21–32)
CREAT SERPL-MCNC: 0.8 MG/DL (ref 0.6–1.1)
EOSINOPHILS ABSOLUTE: 0.3 K/UL (ref 0–0.6)
EOSINOPHILS RELATIVE PERCENT: 3 %
GFR AFRICAN AMERICAN: >60
GFR NON-AFRICAN AMERICAN: >60
GLUCOSE BLD-MCNC: 120 MG/DL (ref 70–99)
HCG QUALITATIVE: NEGATIVE
HCT VFR BLD CALC: 42.4 % (ref 36–48)
HEMOGLOBIN: 14 G/DL (ref 12–16)
LYMPHOCYTES ABSOLUTE: 2.9 K/UL (ref 1–5.1)
LYMPHOCYTES RELATIVE PERCENT: 30 %
MCH RBC QN AUTO: 31.9 PG (ref 26–34)
MCHC RBC AUTO-ENTMCNC: 32.9 G/DL (ref 31–36)
MCV RBC AUTO: 96.8 FL (ref 80–100)
MONOCYTES ABSOLUTE: 0.3 K/UL (ref 0–1.3)
MONOCYTES RELATIVE PERCENT: 3 %
NEUTROPHILS ABSOLUTE: 6.3 K/UL (ref 1.7–7.7)
NEUTROPHILS RELATIVE PERCENT: 64 %
PDW BLD-RTO: 13.9 % (ref 12.4–15.4)
PLATELET # BLD: 310 K/UL (ref 135–450)
PLATELET SLIDE REVIEW: ADEQUATE
PMV BLD AUTO: 8.1 FL (ref 5–10.5)
POTASSIUM REFLEX MAGNESIUM: 4.2 MMOL/L (ref 3.5–5.1)
RBC # BLD: 4.38 M/UL (ref 4–5.2)
RBC # BLD: NORMAL 10*6/UL
SEDIMENTATION RATE, ERYTHROCYTE: 5 MM/HR (ref 0–20)
SLIDE REVIEW: NORMAL
SODIUM BLD-SCNC: 135 MMOL/L (ref 136–145)
TOTAL PROTEIN: 7.2 G/DL (ref 6.4–8.2)
WBC # BLD: 9.8 K/UL (ref 4–11)

## 2022-08-03 PROCEDURE — A4216 STERILE WATER/SALINE, 10 ML: HCPCS | Performed by: EMERGENCY MEDICINE

## 2022-08-03 PROCEDURE — 96372 THER/PROPH/DIAG INJ SC/IM: CPT

## 2022-08-03 PROCEDURE — 86332 IMMUNE COMPLEX ASSAY: CPT

## 2022-08-03 PROCEDURE — 96375 TX/PRO/DX INJ NEW DRUG ADDON: CPT

## 2022-08-03 PROCEDURE — 36415 COLL VENOUS BLD VENIPUNCTURE: CPT

## 2022-08-03 PROCEDURE — 82164 ANGIOTENSIN I ENZYME TEST: CPT

## 2022-08-03 PROCEDURE — 86160 COMPLEMENT ANTIGEN: CPT

## 2022-08-03 PROCEDURE — 85025 COMPLETE CBC W/AUTO DIFF WBC: CPT

## 2022-08-03 PROCEDURE — 80053 COMPREHEN METABOLIC PANEL: CPT

## 2022-08-03 PROCEDURE — 99221 1ST HOSP IP/OBS SF/LOW 40: CPT | Performed by: STUDENT IN AN ORGANIZED HEALTH CARE EDUCATION/TRAINING PROGRAM

## 2022-08-03 PROCEDURE — 2000000000 HC ICU R&B

## 2022-08-03 PROCEDURE — 99285 EMERGENCY DEPT VISIT HI MDM: CPT

## 2022-08-03 PROCEDURE — 85652 RBC SED RATE AUTOMATED: CPT

## 2022-08-03 PROCEDURE — 2500000003 HC RX 250 WO HCPCS: Performed by: INTERNAL MEDICINE

## 2022-08-03 PROCEDURE — A4216 STERILE WATER/SALINE, 10 ML: HCPCS | Performed by: INTERNAL MEDICINE

## 2022-08-03 PROCEDURE — 2580000003 HC RX 258: Performed by: EMERGENCY MEDICINE

## 2022-08-03 PROCEDURE — 99291 CRITICAL CARE FIRST HOUR: CPT | Performed by: INTERNAL MEDICINE

## 2022-08-03 PROCEDURE — 86161 COMPLEMENT/FUNCTION ACTIVITY: CPT

## 2022-08-03 PROCEDURE — 2580000003 HC RX 258: Performed by: INTERNAL MEDICINE

## 2022-08-03 PROCEDURE — 2500000003 HC RX 250 WO HCPCS: Performed by: EMERGENCY MEDICINE

## 2022-08-03 PROCEDURE — 84703 CHORIONIC GONADOTROPIN ASSAY: CPT

## 2022-08-03 PROCEDURE — 96374 THER/PROPH/DIAG INJ IV PUSH: CPT

## 2022-08-03 PROCEDURE — 6360000002 HC RX W HCPCS: Performed by: INTERNAL MEDICINE

## 2022-08-03 PROCEDURE — 86140 C-REACTIVE PROTEIN: CPT

## 2022-08-03 PROCEDURE — 6360000002 HC RX W HCPCS: Performed by: EMERGENCY MEDICINE

## 2022-08-03 PROCEDURE — 82785 ASSAY OF IGE: CPT

## 2022-08-03 RX ORDER — SODIUM CHLORIDE 0.9 % (FLUSH) 0.9 %
5-40 SYRINGE (ML) INJECTION PRN
Status: DISCONTINUED | OUTPATIENT
Start: 2022-08-03 | End: 2022-08-04 | Stop reason: HOSPADM

## 2022-08-03 RX ORDER — ENOXAPARIN SODIUM 100 MG/ML
40 INJECTION SUBCUTANEOUS DAILY
Status: DISCONTINUED | OUTPATIENT
Start: 2022-08-04 | End: 2022-08-04 | Stop reason: HOSPADM

## 2022-08-03 RX ORDER — ONDANSETRON 2 MG/ML
4 INJECTION INTRAMUSCULAR; INTRAVENOUS EVERY 6 HOURS PRN
Status: DISCONTINUED | OUTPATIENT
Start: 2022-08-03 | End: 2022-08-04 | Stop reason: HOSPADM

## 2022-08-03 RX ORDER — ONDANSETRON 4 MG/1
4 TABLET, ORALLY DISINTEGRATING ORAL EVERY 8 HOURS PRN
Status: DISCONTINUED | OUTPATIENT
Start: 2022-08-03 | End: 2022-08-04 | Stop reason: HOSPADM

## 2022-08-03 RX ORDER — DIPHENHYDRAMINE HYDROCHLORIDE 50 MG/ML
50 INJECTION INTRAMUSCULAR; INTRAVENOUS ONCE
Status: COMPLETED | OUTPATIENT
Start: 2022-08-03 | End: 2022-08-03

## 2022-08-03 RX ORDER — METHYLPREDNISOLONE SODIUM SUCCINATE 125 MG/2ML
125 INJECTION, POWDER, LYOPHILIZED, FOR SOLUTION INTRAMUSCULAR; INTRAVENOUS ONCE
Status: COMPLETED | OUTPATIENT
Start: 2022-08-03 | End: 2022-08-03

## 2022-08-03 RX ORDER — PREDNISONE 20 MG/1
40 TABLET ORAL DAILY
Status: DISCONTINUED | OUTPATIENT
Start: 2022-08-04 | End: 2022-08-04 | Stop reason: HOSPADM

## 2022-08-03 RX ORDER — SODIUM CHLORIDE 9 MG/ML
INJECTION, SOLUTION INTRAVENOUS PRN
Status: DISCONTINUED | OUTPATIENT
Start: 2022-08-03 | End: 2022-08-04 | Stop reason: HOSPADM

## 2022-08-03 RX ORDER — SODIUM CHLORIDE 0.9 % (FLUSH) 0.9 %
5-40 SYRINGE (ML) INJECTION EVERY 12 HOURS SCHEDULED
Status: DISCONTINUED | OUTPATIENT
Start: 2022-08-03 | End: 2022-08-04 | Stop reason: HOSPADM

## 2022-08-03 RX ORDER — ACETAMINOPHEN 325 MG/1
650 TABLET ORAL EVERY 6 HOURS PRN
Status: DISCONTINUED | OUTPATIENT
Start: 2022-08-03 | End: 2022-08-04 | Stop reason: HOSPADM

## 2022-08-03 RX ORDER — POLYETHYLENE GLYCOL 3350 17 G/17G
17 POWDER, FOR SOLUTION ORAL DAILY PRN
Status: DISCONTINUED | OUTPATIENT
Start: 2022-08-03 | End: 2022-08-04 | Stop reason: HOSPADM

## 2022-08-03 RX ORDER — SODIUM CHLORIDE, SODIUM LACTATE, POTASSIUM CHLORIDE, CALCIUM CHLORIDE 600; 310; 30; 20 MG/100ML; MG/100ML; MG/100ML; MG/100ML
1000 INJECTION, SOLUTION INTRAVENOUS ONCE
Status: COMPLETED | OUTPATIENT
Start: 2022-08-03 | End: 2022-08-03

## 2022-08-03 RX ORDER — DIPHENHYDRAMINE HYDROCHLORIDE 50 MG/ML
25 INJECTION INTRAMUSCULAR; INTRAVENOUS EVERY 8 HOURS
Status: DISCONTINUED | OUTPATIENT
Start: 2022-08-03 | End: 2022-08-04

## 2022-08-03 RX ORDER — METHYLPREDNISOLONE SODIUM SUCCINATE 125 MG/2ML
INJECTION, POWDER, LYOPHILIZED, FOR SOLUTION INTRAMUSCULAR; INTRAVENOUS
Status: DISPENSED
Start: 2022-08-03 | End: 2022-08-03

## 2022-08-03 RX ORDER — SODIUM CHLORIDE, SODIUM LACTATE, POTASSIUM CHLORIDE, CALCIUM CHLORIDE 600; 310; 30; 20 MG/100ML; MG/100ML; MG/100ML; MG/100ML
INJECTION, SOLUTION INTRAVENOUS CONTINUOUS
Status: DISCONTINUED | OUTPATIENT
Start: 2022-08-03 | End: 2022-08-04 | Stop reason: HOSPADM

## 2022-08-03 RX ORDER — ACETAMINOPHEN 650 MG/1
650 SUPPOSITORY RECTAL EVERY 6 HOURS PRN
Status: DISCONTINUED | OUTPATIENT
Start: 2022-08-03 | End: 2022-08-04 | Stop reason: HOSPADM

## 2022-08-03 RX ADMIN — FAMOTIDINE 20 MG: 10 INJECTION, SOLUTION INTRAVENOUS at 07:56

## 2022-08-03 RX ADMIN — DIPHENHYDRAMINE HYDROCHLORIDE 25 MG: 50 INJECTION, SOLUTION INTRAMUSCULAR; INTRAVENOUS at 17:00

## 2022-08-03 RX ADMIN — SODIUM CHLORIDE, POTASSIUM CHLORIDE, SODIUM LACTATE AND CALCIUM CHLORIDE 1000 ML: 600; 310; 30; 20 INJECTION, SOLUTION INTRAVENOUS at 07:59

## 2022-08-03 RX ADMIN — SODIUM CHLORIDE, POTASSIUM CHLORIDE, SODIUM LACTATE AND CALCIUM CHLORIDE: 600; 310; 30; 20 INJECTION, SOLUTION INTRAVENOUS at 17:00

## 2022-08-03 RX ADMIN — FAMOTIDINE 20 MG: 10 INJECTION, SOLUTION INTRAVENOUS at 21:08

## 2022-08-03 RX ADMIN — METHYLPREDNISOLONE SODIUM SUCCINATE 125 MG: 125 INJECTION, POWDER, FOR SOLUTION INTRAMUSCULAR; INTRAVENOUS at 07:50

## 2022-08-03 RX ADMIN — EPINEPHRINE 0.3 MG: 1 INJECTION INTRAMUSCULAR; INTRAVENOUS; SUBCUTANEOUS at 07:48

## 2022-08-03 RX ADMIN — SODIUM CHLORIDE, PRESERVATIVE FREE 10 ML: 5 INJECTION INTRAVENOUS at 21:09

## 2022-08-03 RX ADMIN — DIPHENHYDRAMINE HYDROCHLORIDE 50 MG: 50 INJECTION, SOLUTION INTRAMUSCULAR; INTRAVENOUS at 07:54

## 2022-08-03 NOTE — PROGRESS NOTES
Pt admitted to ICU via wheelchair. Transferred self to bed. Tolerated well. VSS. SB/SR 59-60. Pt denies pain. Lungs: Clear. Scant periorbital edema. Edema noted to bilateral cheeks/chin/upper neck, tongue/oral. Skin: warm, dry, intact. : Voids to bathroom. GI: BS active x4. Pt up independently. Admission completed. Dr. Archana Evans at bedside. In reviewing chart- it is noted that pt has been to [facility] on 6/2/2022, [facility] 7/6/2022, and now here 8/3/2022 all for angioedema. Pt denies any correlation as far as doing something at the beginning of the month. Standard safety precautions in place. Will continue to monitor.  Ed Adams, KAVONN, RN

## 2022-08-03 NOTE — CARE COORDINATION
Discharge Planning:      MALINA/SAV has reviewed this patient's medical history in detail and updated the computerized patient record. Patient independent prior to admission. There are no needs identified at this time. If something specific is identified, please notify Discharge Planner. MALINA attempted to meet with pt bedside, pt sleeping. Chart reviewed in detail. Readmission Risk Score: 8    PCP:  None listed     Notes: Self-pay, Financial consult placed, will be scheduled at [facility] [place]    Pt will need meds to bed at [address]:  1. Can ONLY be done Monday- Friday between 8:30am-3:30pm.  2. Prescription(s) must be in pharmacy by 3:30pm. to be filled same day or for the weekend  3. Copy of patient's face sheet or insurance/ID should be included. 4. Patient qualifies for financial assistance per the financial counselor. Cost of Rx cannot be added to hospital bill. 5. Patients can then  the prescription on their way out of the hospital at discharge, or pharmacy can deliver to the bedside if staff is available.  (otherwise, payment due at time of pick-up or delivery - cash, check, or card accepted)       Electronically signed by Magdalena Maharaj RN on 8/3/2022 at 2:31 PM

## 2022-08-03 NOTE — ED PROVIDER NOTES
[facility] Emergency Department    Jm Marc MD, am the primary clinician of record. CHIEF COMPLAINT  Chief Complaint   Patient presents with    Facial Swelling     Patient to ER with facial swelling, including tongue. States she is starting to have difficulties breathing and unable to swallow. HISTORY OF PRESENT ILLNESS  Illa Rosy is a 28 y.o. female  who presents to the ED complaining of feeling fine yesterday and went to bed feeling normal as well. This morning she wakes up with notable lip and tongue swelling and some difficulty breathing and swallowing her secretions as a result. She denies any new medicines drinks foods detergents soaps pets people environments or other potential allergens that she can think of and has never had a reaction like this in the past.  She denies any rash vomiting or itching anywhere. Denies any sore throat or tongue pain with the swelling. She does feel like her neck is a little swollen as well. She has never had angioedema before. She does not have a history of hypertension and is not on ACE inhibitor's. Denies any over-the-counter supplements or other medication use. No other complaints, modifying factors or associated symptoms. I have reviewed the following from the nursing documentation. Past Medical History:   Diagnosis Date    Abnormal Pap smear 2011    LEEP done     No past surgical history on file.   Family History   Problem Relation Age of Onset    Arthritis Mother     Depression Mother     Miscarriages / [place] Mother     Diabetes Mother     Mental Illness Brother     Cancer Maternal Aunt     Diabetes Maternal Aunt     Asthma Maternal Uncle     Mental Illness Maternal Uncle     Substance Abuse Maternal Uncle     Cancer Maternal Grandmother      Social History     Socioeconomic History    Marital status: Single     Spouse name: Not on file    Number of children: Not on file    Years of education: Not on file    Highest education level: Not on file   Occupational History    Not on file   Tobacco Use    Smoking status: Every Day     Packs/day: 0.25     Types: Cigars, Cigarettes     Last attempt to quit: 2013     Years since quittin.8    Smokeless tobacco: Never   Vaping Use    Vaping Use: Never used   Substance and Sexual Activity    Alcohol use: Yes    Drug use: No    Sexual activity: Yes     Partners: Male   Other Topics Concern    Not on file   Social History Narrative    Not on file     Social Determinants of Health     Financial Resource Strain: Not on file   Food Insecurity: Not on file   Transportation Needs: Not on file   Physical Activity: Not on file   Stress: Not on file   Social Connections: Not on file   Intimate Partner Violence: Not on file   Housing Stability: Not on file     Current Facility-Administered Medications   Medication Dose Route Frequency Provider Last Rate Last Admin    methylPREDNISolone sodium (SOLU-MEDROL) 125 MG injection              No current outpatient medications on file. No Known Allergies    REVIEW OF SYSTEMS  10 systems reviewed, pertinent positives per HPI otherwise noted to be negative. PHYSICAL EXAM  /68   Pulse 64   Temp 98.3 °F (36.8 °C)   Resp 25   SpO2 100%    GENERAL APPEARANCE: Awake and alert. Cooperative. No distress. HENT: Normocephalic. Atraumatic. Mucous membranes are dry. Tongue is notably swollen, left more so than right, but no tenderness or discoloration to it. The floor the mouth is soft. The posterior oropharynx is partially obscured by the tongue however visualized portion shows median uvula with no deviation and no erythema or exudate. Some trouble with phonation due to tongue size. NECK: Supple. Mild submandibular swelling without redness warmth or tenderness. Trachea midline, no stridor. EYES: PERRL. EOM's grossly intact. HEART/CHEST: RRR. No murmurs. No chest wall tenderness.   LUNGS: Respirations unlabored except mild tachypnea related to tongue/neck findings above. CTAB. Good air exchange. ABDOMEN: No tenderness. Soft. Non-distended. No masses. No organomegaly. No guarding or rebound. Normal bowel sounds throughout. MUSCULOSKELETAL: No extremity edema. Compartments soft. No deformity. No tenderness in the extremities. All extremities neurovascularly intact. SKIN: Warm and dry. No acute rashes. NEUROLOGICAL: Alert and oriented. CN's 2-12 intact. No gross facial drooping. Strength 5/5, sensation intact. 2 plus DTR's in knees bilaterally. Gait normal.  PSYCHIATRIC: Normal mood and affect. LABS  I have reviewed all labs for this visit. Results for orders placed or performed during the hospital encounter of 08/03/22   CBC with Auto Differential   Result Value Ref Range    WBC 9.8 4.0 - 11.0 K/uL    RBC 4.38 4.00 - 5.20 M/uL    Hemoglobin 14.0 12.0 - 16.0 g/dL    Hematocrit 42.4 36.0 - 48.0 %    MCV 96.8 80.0 - 100.0 fL    MCH 31.9 26.0 - 34.0 pg    MCHC 32.9 31.0 - 36.0 g/dL    RDW 13.9 12.4 - 15.4 %    Platelets 197 723 - 576 K/uL    MPV 8.1 5.0 - 10.5 fL   Comprehensive Metabolic Panel w/ Reflex to MG   Result Value Ref Range    Sodium 135 (L) 136 - 145 mmol/L    Potassium reflex Magnesium 4.2 3.5 - 5.1 mmol/L    Chloride 102 99 - 110 mmol/L    CO2 24 21 - 32 mmol/L    Anion Gap 9 3 - 16    Glucose 120 (H) 70 - 99 mg/dL    BUN 8 7 - 20 mg/dL    Creatinine 0.8 0.6 - 1.1 mg/dL    GFR Non-African American >60 >60    GFR African American >60 >60    Calcium 9.5 8.3 - 10.6 mg/dL    Total Protein 7.2 6.4 - 8.2 g/dL    Albumin 4.2 3.4 - 5.0 g/dL    Albumin/Globulin Ratio 1.4 1.1 - 2.2    Total Bilirubin 1.2 (H) 0.0 - 1.0 mg/dL    Alkaline Phosphatase 71 40 - 129 U/L    ALT 12 10 - 40 U/L    AST 22 15 - 37 U/L   hCG, serum, qualitative   Result Value Ref Range    hCG Qual Negative Detects HCG level >10 MIU/mL       ED COURSE/MDM  Patient seen and evaluated. Old records reviewed.  Labs and imaging reviewed and results discussed with patient. The patient's ED workup was notable for idiopathic angioedema. She has not hypertensive or have a history of ACE inhibitor use. Symptoms are fairly acute onset this morning. She is protecting her airway on initial assessment although due to her large tongue size I did discuss the topic of potential protective intubation for her. At this time after triage assessment we decided to try antihistamines and steroids and EpiPen and serial close monitoring assessments. I did reassess her several times and though her tongue was still notably swollen, her submandibular swelling did improve and her tongue was actually smaller than it was on initial assessment. She says that swallowing has become easier and breathing has become a little easier as well. She does not have stridor. At this point we will continue to closely monitor her however we will hold off on protective intubation for now as she is improving with medical management. No pain, fever, leukocytosis, or other suggestion of an infection cause. I'm told by pharmacy we do not have Icatibant at this hospital.  ENT will be consulted for additional recommendations as well. No specific allergen to explain the sx or other stigmata of allergic reaction. Is this patient to be included in the SEP-1 Core Measure? No   Exclusion criteria - the patient is NOT to be included for SEP-1 Core Measure due to:   Infection is not suspected      During the patient's ED course, the patient was given:  Medications   methylPREDNISolone sodium (SOLU-MEDROL) 125 MG injection (has no administration in time range)   EPINEPHrine 1 MG/ML injection 0.3 mg (0.3 mg IntraMUSCular Given 8/3/22 2181)   methylPREDNISolone sodium (SOLU-MEDROL) injection 125 mg (125 mg IntraVENous Given 8/3/22 9290)   lactated ringers infusion 1,000 mL (1,000 mLs IntraVENous New Bag 8/3/22 8579)   famotidine (PEPCID) 20 mg in sodium chloride (PF) 10 mL injection (20 mg IntraVENous Given 8/3/22 8559)   diphenhydrAMINE (BENADRYL) injection 50 mg (50 mg IntraVENous Given 8/3/22 4540)        CLINICAL IMPRESSION  1. Angioedema, initial encounter        Blood pressure 102/68, pulse 64, temperature 98.3 °F (36.8 °C), resp. rate 25, SpO2 100 %, not currently breastfeeding. DISPOSITION  Sonam Robins was admitted in fair condition. The plan is to admit to the hospital at this time under the hospitalist service. Hospitalist accepted the patient and will take over the patient's care. The total critical care time I independently spent while evaluating and treating this patient was 35 minutes. This excludes time spent doing separately billable procedures. This includes time at the bedside, data interpretation, medication management, obtaining critical history from collateral sources if the patient is unable to provide it directly, and physician consultation. Specifics of interventions taken and potentially life-threatening diagnostic considerations are listed above in the medical decision making. If this was a shared visit with an ZEV, the time in this attestation is non-concurrent critical care time out of the total shared critical care time provided by the ZEV and myself. DISCLAIMER: This chart was created using Dragon dictation software. Efforts were made by me to ensure accuracy, however some errors may be present due to limitations of this technology and occasionally words are not transcribed correctly.         Tommy Parikh MD  08/03/22 4748

## 2022-08-03 NOTE — CARE COORDINATION
08/03/22 1434   Service Assessment   Patient Orientation Alert and Oriented   Cognition Other (see comment)  (sleeping 2/2 mya)   History Provided By Medical Record   Primary Caregiver Self   PCP Verified by CM No  (No PCP identified)   Prior Functional Level Independent in ADLs/IADLs   Current Functional Level Independent in ADLs/IADLs   Can patient return to prior living arrangement Yes   Family able to assist with home care needs: Yes   Social/Functional History   ADL Assistance Independent   Homemaking Assistance Independent   Ambulation Assistance Independent   Transfer Assistance Independent   Active  Yes   Occupation Unemployed   Discharge Planning   Potential Assistance Purchasing Medications Yes  (will need meds 2 bed)   Patient expects to be discharged to: [facility] signed by Kathrine Hazel RN on 8/3/2022 at 2:39 PM

## 2022-08-03 NOTE — PROGRESS NOTES
Speech Language Pathology  Attempt    Sonda Light  1990    SLP Eval and Treat orders were received. Attempted to see patient for bedside swallow evaluation, RN reported Pt is NPO pending ENT consult. Will hold and follow up on 8/4/22.     Amalia Sanabria M.A., [address] [place] [address]  Speech-Language Pathologist

## 2022-08-03 NOTE — H&P
Appendectomy                                                                                                              HOSPITALISTS HISTORY AND PHYSICAL    8/3/2022 2:18 PM    Patient Information:  Vanesa Campo is a 28 y.o. female [id number]  PCP:  No primary care provider on file. (Tel: None )    Chief complaint:    Chief Complaint   Patient presents with    Facial Swelling     Patient to ER with facial swelling, including tongue. States she is starting to have difficulties breathing and unable to swallow. History of Present Illness:  Adebayo Acosta is a 28 y.o. female without any significant past medical history presented with lips & tongue swelling and difficulty breathing. Patient reported that she woke up this morning with a feeling that she could not breathe and noticed that her lips and tongue were swollen and had difficulty swallowing and shortness of breath. Patient denied any fever, chills, URI symptoms, chest pain, palpitations, bowel or bladder dysfunction, taking ACE or ARB, any new prescribed or over-the-counter medications/food, change in soaps/detergent, pet exposure, recent travel, or sick contact. Of note, [facility] generalized itching, redness,swelling, hoarseness and difficulty swallowing. Patient symptoms resolved after receiving IV Solu-Medrol, Benadryl and Pepcid and was discharged home on cetirizine, famotidine and EpiPen. [facility] with lip and tongue swelling and was discharged on Medrol Dosepak, Benadryl and Pepcid and was instructed to make appointment with outpatient allergy testing. Patient did not seek any medical attention after discharge as her symptoms resolved. On admission, patient was hemodynamically stable. Patient was given EpiPen, IV Solu-Medrol and antihistamine in the ED.       REVIEW OF SYSTEMS:   Detailed 12 point ROS obtained which were negative except what mentioned above in HPI    Past Medical History:   has a past medical history of Abnormal Pap smear. Past Surgical History:   has no past surgical history on file. Medications:  No current facility-administered medications on file prior to encounter. No current outpatient medications on file prior to encounter. Allergies:  No Known Allergies     Social History:   reports that she has been smoking cigars and cigarettes. She has a 2.25 pack-year smoking history. She has never used smokeless tobacco. She reports current alcohol use of about 12.0 standard drinks per week. She reports current drug use. Frequency: 3.00 times per week. Drug: Marijuana Isha Bath). Family History:  family history includes Arthritis in her mother; Asthma in her maternal uncle; Cancer in her maternal aunt and maternal grandmother; Depression in her mother; Diabetes in her maternal aunt and mother; Mental Illness in her brother and maternal uncle; Fabio Padgett / Jyoti Daniel in her mother; Substance Abuse in her maternal uncle. Physical Exam:  /76   Pulse 59   Temp 97.1 °F (36.2 °C) (Temporal)   Resp 14   Ht 5' 7\" (1.702 m)   Wt 195 lb 1.7 oz (88.5 kg)   LMP 07/20/2022 (Exact Date)   SpO2 100%   BMI 30.56 kg/m²     General appearance: No apparent distress appears stated age and cooperative. HEENT Normal cephalic, atraumatic without obvious deformity. Pupils equal, round, and reactive to light. Extra ocular muscles intact. Tongue and lip swelling noted  Neck: Supple, No jugular venous distention/bruits. Trachea midline without thyromegaly   Lungs: Clear to auscultation, bilaterally without Rales/Wheezes/Rhonchi with good respiratory effort. Heart: Regular rate and rhythm with Normal S1/S2 without murmurs  Abdomen: Soft, non-tender or non-distended without rigidity or guarding and positive BS all four quadrants. Extremities: No clubbing, cyanosis, or edema bilaterally.   Full range of motion without deformity   Skin: Skin color, texture, turgor normal.  No rashes or lesions. Neurologic: Alert and oriented X 3, neurovascularly intact with sensory/motor intact upper extremities/lower extremities, bilaterally. Cranial nerves: II-XII intact, grossly non-focal.  Psychiatry: Appropriate affect. Not agitated  Skin: Warm, dry, normal turgor, no rash  Brisk capillary refill, peripheral pulses palpable     Labs:  CBC:   Lab Results   Component Value Date/Time    WBC 9.8 08/03/2022 07:45 AM    RBC 4.38 08/03/2022 07:45 AM    HGB 14.0 08/03/2022 07:45 AM    HCT 42.4 08/03/2022 07:45 AM    MCV 96.8 08/03/2022 07:45 AM    MCH 31.9 08/03/2022 07:45 AM    MCHC 32.9 08/03/2022 07:45 AM    RDW 13.9 08/03/2022 07:45 AM     08/03/2022 07:45 AM    MPV 8.1 08/03/2022 07:45 AM     BMP:    Lab Results   Component Value Date/Time     08/03/2022 07:45 AM    K 4.2 08/03/2022 07:45 AM     08/03/2022 07:45 AM    CO2 24 08/03/2022 07:45 AM    BUN 8 08/03/2022 07:45 AM    CREATININE 0.8 08/03/2022 07:45 AM    CALCIUM 9.5 08/03/2022 07:45 AM    GFRAA >60 08/03/2022 07:45 AM    GFRAA >60 05/30/2013 02:18 PM    LABGLOM >60 08/03/2022 07:45 AM    GLUCOSE 120 08/03/2022 07:45 AM     No orders to display       Discussed case  with ED physician    Problem List  Principal Problem:    Angioedema of lips, initial encounter  Resolved Problems:    * No resolved hospital problems.  *        Assessment/Plan:     Angioedema: Recurrent  Presented with a lips& tongue swelling and shortness of breath  Status post epi/IV Solu-Medrol and antihistamine  ESR, CRP, C3-C4, GE angiotensin-converting enzyme, C1 esterase inhibitor, C1q binding assay ordered  ENT, speech and pulmonology consulted  Admitted in the ICU for close monitoring for her respiratory status  Supplemental oxygen if needed Lasix keep sats above 92%  Continue steroids, Benadryl and famotidine      DVT prophylaxis: Lovenox  Code status: Full    Admit as inpatient I anticipate hospitalization spanning less  than two midnights for investigation and treatment of the above medically necessary diagnoses. Please note that some part of this chart was generated using Dragon dictation software. Although every effort was made to ensure the accuracy of this automated transcription, some errors in transcription may have occurred inadvertently. If you may need any clarification, please do not hesitate to contact me through [facility].        Guerita Kahn MD    8/3/2022 2:18 PM

## 2022-08-03 NOTE — CONSULTS
[facility]      Patient Name: Lillie Alicea  Medical Record Number:  [id number]  Primary Care Physician:  No primary care provider on file. Date of Consultation: 8/3/2022    Chief Complaint: Lips and tongue swelling, shortness of breath      HISTORY OF PRESENT ILLNESS  Karon Manzano is a(n) 28 y.o. female who presents with acute throat, tongue, cheek swelling this morning upon waking up. Denies novel foods or medications. She has had multiple similar episodes of this since the beginning of June for which she has been hospitalized for. She states that this episode seemed to be the worst.  She woke up and she was unable to swallow her own saliva. She was on lisinopril for only 2 days at the beginning of June and was taken off of it after noting normotensive blood pressures. Earlier today her throat felt very raw, but this is also significantly improved. Now with only a minor sore throat. Overall she feels like she is approximately 80% better. No known environmental or food allergies. No history of allergy testing in the past.  No history of head or neck surgery. Patient Active Problem List   Diagnosis    Anemia    GBS (group B streptococcus) UTI complicating pregnancy; TREAT FOR +GBS IN LABOR    ROM (rupture of membranes), premature    GBS carrier    Angioedema    Allergic angioedema    Angioedema of lips, initial encounter     History reviewed. No pertinent surgical history.   Family History   Problem Relation Age of Onset    Arthritis Mother     Depression Mother     Miscarriages / [place] Mother     Diabetes Mother     Mental Illness Brother     Cancer Maternal Aunt     Diabetes Maternal Aunt     Asthma Maternal Uncle     Mental Illness Maternal Uncle     Substance Abuse Maternal Uncle     Cancer Maternal Grandmother      Social History     Socioeconomic History    Marital status: Single     Spouse name: Not on file    Number of children: 2    Years of education: Not on file    Highest education level: Not on file   Occupational History    Occupation: Kassidy [employer] at [employer] Cassie Said Use    Smoking status: Every Day     Packs/day: 0.25     Years: 9.00     Pack years: 2.25     Types: Cigars, Cigarettes    Smokeless tobacco: Never   Vaping Use    Vaping Use: Former    Substances: Flavoring, does not use currently    Devices: Pre-filled or refillable cartridge, does not use currently   Substance and Sexual Activity    Alcohol use: Yes     Alcohol/week: 12.0 standard drinks     Types: 4 Glasses of wine, 8 Shots of liquor per week    Drug use: Yes     Frequency: 3.0 times per week     Types: Marijuana Rebecca Shayan)    Sexual activity: Yes     Partners: Male   Other Topics Concern    Not on file   Social History Narrative    Not on file     Social Determinants of Health     Financial Resource Strain: Not on file   Food Insecurity: Not on file   Transportation Needs: Not on file   Physical Activity: Not on file   Stress: Not on file   Social Connections: Not on file   Intimate Partner Violence: Not on file   Housing Stability: Not on file       DRUG/FOOD ALLERGIES: Patient has no known allergies.     CURRENT MEDICATIONS  Prior to Admission medications    Not on File       REVIEW OF SYSTEMS  The following systems were reviewed and revealed the following in addition to any already discussed in the HPI:    CONSTITUTIONAL: no weight loss, no fever, no night sweats, no chills  EYES: no vision changes, no blurry vision  EARS: no changes in hearing, no otalgia  NOSE: no epistaxis, no rhinorrhea  RESPIRATORY: + difficulty breathing, + shortness of breath  CV: no chest pain, no lower extremity swelling  HEME: no coagulation disorder, no known bleeding disorders  NEURO: no TIA or stroke-like symptoms  SKIN: no new rashes in the head and neck, no recently diagnosed skin cancers  MOUTH: no new oral ulcers, no recent tooth infections  GASTROINTESTINAL: no diarrhea, no stomach pleasant 28 y.o. female with recurrent angioedema. Recurrent angioedema  -Overall significantly improved after receiving epinephrine, IV steroids, antihistamines in the emergency department. Currently on oral prednisone, famotidine, Benadryl. Recommend overnight observation in the ICU, if she does well overnight and symptomatically improved can consider transitioning to lower level care in the morning.  -Recommend angioedema work-up given recurrence, this is already been initiated by the primary team  - Would benefit from outpatient follow-up with allergist, [facility]. This group's information was placed in the patient's discharge instructions for her to call when she gets discharged.  -No ENT interventions at this point given improvement. Please do not hesitate to call with any questions or concerns        Darnell Singh   Department of Otolaryngology/Head and Neck Surgery      Medical Decision Making: The following items were considered in medical decision making:  Independent review of images  Review / order clinical lab tests  Review / order radiology tests  Decision to obtain old records      This note was generated completely or in part utilizing Dragon dictation speech recognition software. Occasionally, words are mistranscribed and despite editing, the text may contain inaccuracies due to incorrect word recognition. If further clarification is needed please contact the office at [phone].

## 2022-08-03 NOTE — CONSULTS
PULMONARY AND CRITICAL CARE MEDICINE CONSULTATION NOTE    CONSULTING PHYSICIAN:  Suni Kc MD    ADMISSION DATE: 8/3/2022  ADMISSION DIAGNOSIS: Angioedema of lips, initial encounter [T78. 3XXA]  Angioedema, initial encounter [T78. 3XXA]    REASON FOR CONSULT:   Chief Complaint   Patient presents with    Facial Swelling     Patient to ER with facial swelling, including tongue. States she is starting to have difficulties breathing and unable to swallow. DATE OF CONSULT: 8/3/2022    HISTORY OF PRESENT ILLNESS: 28y.o. year old female with no significant past medical history comes into the hospital again with tongue and mouth swelling. Patient has had frequent episodes of angioedema this year. In the past 3 months she has had 3 episodes including this 1. No inciting factors have been able to be established. Patient denies using any new cosmetic products, eating any new food product, getting exposed to any abnormal chemicals or fumes at work. Patient denies any urticaria associated with angioedema. She did require brief admissions and previous episodes. Responded to IV steroids and Benadryl in the previous admissions. No family history of angioedema or severe allergies. Patient does not have hypertension and is currently not on any medications. Denies any infections in the oral cavity recently. At the time of interview patient sitting in bed in no apparent distress. Reports feeling better, but still has both tongue and oral swelling. Still finding it difficult to swallow her saliva. Denies any shortness of breath, chest pain, chest tightness, palpitation, dizziness or lightheadedness. No arthralgias, photosensitive rash, oral ulcers or Raynaud's phenomena. No anorexia or weight loss. REVIEW OF SYSTEMS:     CONSTITUTIONAL SYMPTOMS: The patient denies fever, fatigue, night sweats, weight loss or weight gain. HEENT: No vision changes. No tinnitus, Denies sinus pain.  No hoarseness, or dysphagia. NECK: Patient denies swelling in the neck. CARDIOVASCULAR: Denies chest pain, palpitation, syncope. RESPIRATORY: As per HPI. GASTROINTESTINAL: Denies nausea, abdominal pain or change in bowel function. GENITOURINARY: Denies obstructive symptoms. No history of incontinence. BREASTS: No masses or lumps in the breasts. SKIN: No rashes or itching. MUSCULOSKELETAL: Denies weakness or bone pain. NEUROLOGICAL: No headaches or seizures. PSYCHIATRIC: Denies mood swings or depression. ENDOCRINE: Denies heat or cold intolerance or excessive thirst.  HEMATOLOGIC/LYMPHATIC: Denies easy bruising or lymph node swelling. ALLERGIC/IMMUNOLOGIC: No environmental allergies. PAST MEDICAL HISTORY:   Past Medical History:   Diagnosis Date    Abnormal Pap smear 2011    LEEP done       PAST SURGICAL HISTORY: History reviewed. No pertinent surgical history. SOCIAL HISTORY:   Social History     Tobacco Use    Smoking status: Every Day     Packs/day: 0.25     Years: 9.00     Pack years: 2.25     Types: Cigars, Cigarettes    Smokeless tobacco: Never   Vaping Use    Vaping Use: Former    Substances: Flavoring, does not use currently    Devices: Pre-filled or refillable cartridge, does not use currently   Substance Use Topics    Alcohol use: Yes     Alcohol/week: 12.0 standard drinks     Types: 4 Glasses of wine, 8 Shots of liquor per week    Drug use: Yes     Frequency: 3.0 times per week     Types: Marijuana Darryle Pimple)       FAMILY HISTORY:   Family History   Problem Relation Age of Onset    Arthritis Mother     Depression Mother     Miscarriages / [place] Mother     Diabetes Mother     Mental Illness Brother     Cancer Maternal Aunt     Diabetes Maternal Aunt     Asthma Maternal Uncle     Mental Illness Maternal Uncle     Substance Abuse Maternal Uncle     Cancer Maternal Grandmother        MEDICATIONS:     No current facility-administered medications on file prior to encounter.      No current outpatient medications on file prior to encounter. methylPREDNISolone sodium        sodium chloride flush  5-40 mL IntraVENous 2 times per day    [START ON 8/4/2022] enoxaparin  40 mg SubCUTAneous Daily    famotidine (PEPCID) injection  20 mg IntraVENous BID    diphenhydrAMINE  25 mg IntraVENous Q8H    [START ON 8/4/2022] predniSONE  40 mg Oral Daily      sodium chloride       sodium chloride flush, sodium chloride, ondansetron **OR** ondansetron, polyethylene glycol, acetaminophen **OR** acetaminophen      ALLERGIES:   Allergies as of 08/03/2022    (No Known Allergies)      OBJECTIVE:   height is 5' 7\" (1.702 m) and weight is 195 lb 1.7 oz (88.5 kg). Her temporal temperature is 97.1 °F (36.2 °C). Her blood pressure is 105/76 and her pulse is 59. Her respiration is 14 and oxygen saturation is 100%. No intake/output data recorded. PHYSICAL EXAM:    CONSTITUTIONAL: She is a 28y.o.-year-old who appears her stated age. She is alert and oriented x 3 and in no acute distress. HEENT: PERRLA, EOMI. No scleral icterus. Tongue is swollen, posterior oral pillars also appear swollen. Uvula mildly swollen. Swelling seen in bilateral mandibular and submandibular region. No oral lesion appreciated. NECK: Supple, without cervical or supraclavicular lymphadenopathy:  CARDIOVASCULAR: S1 S2 RRR. Without murmer  RESPIRATORY & CHEST: Lungs are clear to auscultation and percussion. No wheezing, no crackles. Good air movement  GASTROINTESTINAL & ABDOMEN: Soft, nontender, positive bowel sounds in all quadrants, non-distended, without hepatosplenomegaly. GENITOURINARY: Deferred. MUSCULOSKELETAL: No tenderness to palpation of the axial skeleton. There is no clubbing. No cyanosis. No edema of the lower extremities. SKIN OF BODY: No rash or jaundice. PSYCHIATRIC EVALUATION: Normal affect. Patient answers questions appropriately. HEMATOLOGIC/LYMPHATIC/ IMMUNOLOGIC: No palpable lymphadenopathy.   NEUROLOGIC: Alert and oriented x 3.Groslly non-focal. Motor strength is 5+/5 in all muscle groups. The patient has a normal sensorium globally. LABS:  Recent Labs     08/03/22  0745   WBC 9.8   HGB 14.0   HCT 42.4      ALT 12   AST 22   *   K 4.2      CREATININE 0.8   BUN 8   CO2 24       Recent Labs     08/03/22  0745   GLUCOSE 120*   CALCIUM 9.5   *   K 4.2   CO2 24      BUN 8   CREATININE 0.8       No results for input(s): PHART, WWB6ICY, PO2ART, SLM2DLP, M7JMSPEP, BEART, R9XXIBVF in the last 72 hours. No results found for: INR, PROTIME  Lab Results   Component Value Date/Time    AMYLASE 49 10/31/2010 11:25 AM      No results found for: LABA1C  No results found for: EAG  No results found for: TSH, U0PPVCY, H1FZXVJ, THYROIDAB, FT3, T4FREE  No results found for: CKTOTAL, CKMB, CKMBINDEX, TROPONINI   Lab Results   Component Value Date    CRP 8.4 (H) 08/03/2022      No results found for: BNP   No results found for: DDIMER   No results found for: FERRITIN   No results found for: LACTA        IMAGING:        IMPRESSION:     Recurrent angioedema    RECOMMENDATION:     Patient has again presented to the hospital with tongue and facial swelling. No clear inciting agent present. She does not have any history of allergies. No family history of angioedema. Possibility of C1 inhibitor deficiency is there. We will check both C3 and C4 levels. Other possibilities include undetected autoimmune disease, occult malignancies. Currently not having any suspicious symptomatology to suggest either. She is protecting her airway and maintaining good oxygen saturation on room air. We will closely monitor for any respiratory distress. She received Solu-Medrol 120 mg IV x1 and epinephrine in the ER. Also getting Benadryl 25 mg IV every 8 hours. We will continue with Benadryl and Pepcid. No clear role of FFP at this time. Keep her n.p.o. at this time.     Total critical care time caring for this patient with life threatening illness, including direct patient contact, management of life support systems, review of data including imaging and labs, discussions with other team members and physicians is at least 32 minutes so far today, excluding procedures. Darian Kraus MD  Pulmonary Critical Care and Sleep Medicine  8/3/2022, 2:17 PM    This note was completed using dragon medical speech recognition software. Grammatical errors, random word insertions, pronoun errors and incomplete sentences are occasional consequences of this technology due to software limitations. If there are questions or concerns about the content of this note of information contained within the body of this dictation they should be addressed with the provider for clarification.

## 2022-08-03 NOTE — ED NOTES
Report given to ICU RN, no questions or concerns at this time. Patient transported to ICU on telemetry in wheelchair by this RN.      Aurora Gamble RN  08/03/22 [id number]

## 2022-08-04 VITALS
WEIGHT: 195.33 LBS | OXYGEN SATURATION: 100 % | DIASTOLIC BLOOD PRESSURE: 83 MMHG | HEART RATE: 73 BPM | TEMPERATURE: 97.2 F | BODY MASS INDEX: 30.66 KG/M2 | RESPIRATION RATE: 18 BRPM | HEIGHT: 67 IN | SYSTOLIC BLOOD PRESSURE: 121 MMHG

## 2022-08-04 LAB
ANION GAP SERPL CALCULATED.3IONS-SCNC: 10 MMOL/L (ref 3–16)
BASOPHILS ABSOLUTE: 0.1 K/UL (ref 0–0.2)
BASOPHILS RELATIVE PERCENT: 0.4 %
BUN BLDV-MCNC: 7 MG/DL (ref 7–20)
CALCIUM SERPL-MCNC: 9.5 MG/DL (ref 8.3–10.6)
CHLORIDE BLD-SCNC: 103 MMOL/L (ref 99–110)
CO2: 21 MMOL/L (ref 21–32)
CREAT SERPL-MCNC: 0.7 MG/DL (ref 0.6–1.1)
EOSINOPHILS ABSOLUTE: 0 K/UL (ref 0–0.6)
EOSINOPHILS RELATIVE PERCENT: 0.2 %
GFR AFRICAN AMERICAN: >60
GFR NON-AFRICAN AMERICAN: >60
GLUCOSE BLD-MCNC: 100 MG/DL (ref 70–99)
HCT VFR BLD CALC: 39.8 % (ref 36–48)
HEMOGLOBIN: 12.9 G/DL (ref 12–16)
LYMPHOCYTES ABSOLUTE: 2.4 K/UL (ref 1–5.1)
LYMPHOCYTES RELATIVE PERCENT: 18.5 %
MCH RBC QN AUTO: 31.5 PG (ref 26–34)
MCHC RBC AUTO-ENTMCNC: 32.4 G/DL (ref 31–36)
MCV RBC AUTO: 97.1 FL (ref 80–100)
MONOCYTES ABSOLUTE: 0.9 K/UL (ref 0–1.3)
MONOCYTES RELATIVE PERCENT: 6.5 %
NEUTROPHILS ABSOLUTE: 9.8 K/UL (ref 1.7–7.7)
NEUTROPHILS RELATIVE PERCENT: 74.4 %
PDW BLD-RTO: 13.9 % (ref 12.4–15.4)
PLATELET # BLD: 273 K/UL (ref 135–450)
PMV BLD AUTO: 8 FL (ref 5–10.5)
POTASSIUM REFLEX MAGNESIUM: 4.6 MMOL/L (ref 3.5–5.1)
RBC # BLD: 4.1 M/UL (ref 4–5.2)
SODIUM BLD-SCNC: 134 MMOL/L (ref 136–145)
WBC # BLD: 13.2 K/UL (ref 4–11)

## 2022-08-04 PROCEDURE — 2500000003 HC RX 250 WO HCPCS: Performed by: INTERNAL MEDICINE

## 2022-08-04 PROCEDURE — 6360000002 HC RX W HCPCS: Performed by: INTERNAL MEDICINE

## 2022-08-04 PROCEDURE — 92610 EVALUATE SWALLOWING FUNCTION: CPT

## 2022-08-04 PROCEDURE — 80048 BASIC METABOLIC PNL TOTAL CA: CPT

## 2022-08-04 PROCEDURE — A4216 STERILE WATER/SALINE, 10 ML: HCPCS | Performed by: INTERNAL MEDICINE

## 2022-08-04 PROCEDURE — 85025 COMPLETE CBC W/AUTO DIFF WBC: CPT

## 2022-08-04 PROCEDURE — 92526 ORAL FUNCTION THERAPY: CPT

## 2022-08-04 PROCEDURE — 2580000003 HC RX 258: Performed by: INTERNAL MEDICINE

## 2022-08-04 PROCEDURE — 36415 COLL VENOUS BLD VENIPUNCTURE: CPT

## 2022-08-04 PROCEDURE — 99233 SBSQ HOSP IP/OBS HIGH 50: CPT | Performed by: INTERNAL MEDICINE

## 2022-08-04 PROCEDURE — 6370000000 HC RX 637 (ALT 250 FOR IP): Performed by: INTERNAL MEDICINE

## 2022-08-04 RX ORDER — DIPHENHYDRAMINE HCL 25 MG
25 TABLET ORAL EVERY 8 HOURS PRN
Status: DISCONTINUED | OUTPATIENT
Start: 2022-08-04 | End: 2022-08-04 | Stop reason: HOSPADM

## 2022-08-04 RX ORDER — CETIRIZINE HYDROCHLORIDE 10 MG/1
10 TABLET ORAL DAILY
Qty: 30 TABLET | Refills: 0 | Status: SHIPPED | OUTPATIENT
Start: 2022-08-05 | End: 2022-09-04

## 2022-08-04 RX ORDER — EPINEPHRINE 0.3 MG/.3ML
0.3 INJECTION SUBCUTANEOUS ONCE
Qty: 0.3 ML | Refills: 1 | Status: SHIPPED | OUTPATIENT
Start: 2022-08-04 | End: 2022-08-04

## 2022-08-04 RX ORDER — FAMOTIDINE 20 MG/1
20 TABLET, FILM COATED ORAL 2 TIMES DAILY
Qty: 10 TABLET | Refills: 1 | Status: SHIPPED | OUTPATIENT
Start: 2022-08-04 | End: 2022-08-09

## 2022-08-04 RX ORDER — DIPHENHYDRAMINE HCL 25 MG
25 TABLET ORAL EVERY 8 HOURS PRN
Qty: 15 TABLET | Refills: 1 | Status: SHIPPED | OUTPATIENT
Start: 2022-08-04 | End: 2022-08-04 | Stop reason: HOSPADM

## 2022-08-04 RX ORDER — PREDNISONE 20 MG/1
40 TABLET ORAL DAILY
Qty: 8 TABLET | Refills: 0 | Status: CANCELLED | OUTPATIENT
Start: 2022-08-05 | End: 2022-08-09

## 2022-08-04 RX ORDER — PREDNISONE 20 MG/1
40 TABLET ORAL DAILY
Qty: 8 TABLET | Refills: 0 | Status: SHIPPED | OUTPATIENT
Start: 2022-08-05 | End: 2022-08-09

## 2022-08-04 RX ADMIN — FAMOTIDINE 20 MG: 10 INJECTION, SOLUTION INTRAVENOUS at 08:02

## 2022-08-04 RX ADMIN — DIPHENHYDRAMINE HYDROCHLORIDE 25 MG: 50 INJECTION, SOLUTION INTRAMUSCULAR; INTRAVENOUS at 00:22

## 2022-08-04 RX ADMIN — SODIUM CHLORIDE, PRESERVATIVE FREE 10 ML: 5 INJECTION INTRAVENOUS at 08:03

## 2022-08-04 RX ADMIN — DIPHENHYDRAMINE HYDROCHLORIDE 25 MG: 50 INJECTION, SOLUTION INTRAMUSCULAR; INTRAVENOUS at 08:02

## 2022-08-04 RX ADMIN — ENOXAPARIN SODIUM 40 MG: 100 INJECTION SUBCUTANEOUS at 08:02

## 2022-08-04 RX ADMIN — PREDNISONE 40 MG: 20 TABLET ORAL at 08:02

## 2022-08-04 NOTE — PLAN OF CARE
Problem: Discharge Planning  Goal: Discharge to home or other facility with appropriate resources  Outcome: Progressing  Flowsheets (Taken 8/4/2022 0007)  Discharge to home or other facility with appropriate resources: Identify barriers to discharge with patient and caregiver     Problem: Safety - Adult  Goal: Free from fall injury  Outcome: Progressing

## 2022-08-04 NOTE — FLOWSHEET NOTE
Resting quietly, states swelling in throat, lips, tongue and cheeks improved; throat soreness is gone. Able to manage secretions throughout shift. Denies complaints.

## 2022-08-04 NOTE — DISCHARGE INSTRUCTIONS
Follow up with your PCP within 3-4 days of discharge and have PCP follow-up on the lab work-up results   Make appointment with [facility] on [facility]  Return to the ED if he had recurrence of symptoms or worsening swelling  Take all your medications as prescribed.

## 2022-08-04 NOTE — PROGRESS NOTES
4 Eyes Skin Assessment     NAME:  Lincoln Gutierrez  YOB: 1990  MEDICAL RECORD NUMBER:  [id number]    The patient is being assess for  Admission    I agree that 2 RN's have performed a thorough Head to Toe Skin Assessment on the patient. ALL assessment sites listed below have been assessed. Areas assessed by both nurses:    Head, Face, Ears, Shoulders, Back, Chest, Arms, Elbows, Hands, Sacrum. Buttock, Coccyx, Ischium, and Legs. Feet and Heels        Does the Patient have a Wound?  No noted wound(s)       Yvon Prevention initiated:  No   Wound Care Orders initiated:  No    Pressure Injury (Stage 3,4, Unstageable, DTI, NWPT, and Complex wounds) if present place referral/consult order under [de-identified] NA    New and Established Ostomies if present place consult order under : NA      Nurse 1 eSignature: Electronically signed by Jc Gomez RN on 8/3/22 at 9:09 PM EDT    **SHARE this note so that the co-signing nurse is able to place an eSignature**    Nurse 2 eSignature: Electronically signed by Andrei Sprague RN on 8/3/22 at 9:10 PM EDT

## 2022-08-04 NOTE — PROGRESS NOTES
Pt. ate 1/2 her lunch without difficulty chewing or swallowing. PT. Dressed and belongings gathered. Pt. Discharged to outpatient pharmacy at 1400. Pt. Denies further needs. An appointment made for her to [place] [id number]. [facility] Aug 24. Referral order faxed to [address] [place] [address] for Allergy Appt. Pt. taken to outpatient Pharmacy to  home meds and get instructed on use of Epi-Pen per pharmacist instruction. Pt. With belongings and discharged home through main entrance with friend.

## 2022-08-04 NOTE — PROGRESS NOTES
Pt. In bed awake. Denies pain, nausea, shortness of breath, dizziness, or chest pain. Cheeks and chin puffiness. Speech pathology to bedside to evaluate swallowing. Pt. Remains NPO. Up to [facility] as needed.

## 2022-08-04 NOTE — PROGRESS NOTES
PULMONARY AND CRITICAL CARE MEDICINE PROGRESS NOTE    Subjective: Patient lying in bed in no apparent respiratory distress. No events overnight. Facial and tongue swelling slowly subsiding. Remains on room air. Able to take p.o. diet. Denies any shortness of breath, cough, chest pain or chest tightness. REVIEW OF SYSTEMS:     8 point review of system is negative except that mentioned in the subjective portion. PAST MEDICAL HISTORY:   Past Medical History:   Diagnosis Date    Abnormal Pap smear 2011    LEEP done       PAST SURGICAL HISTORY: History reviewed. No pertinent surgical history. SOCIAL HISTORY:   Social History     Tobacco Use    Smoking status: Every Day     Packs/day: 0.25     Years: 9.00     Pack years: 2.25     Types: Cigars, Cigarettes    Smokeless tobacco: Never   Vaping Use    Vaping Use: Former    Substances: Flavoring, does not use currently    Devices: Pre-filled or refillable cartridge, does not use currently   Substance Use Topics    Alcohol use: Yes     Alcohol/week: 12.0 standard drinks     Types: 4 Glasses of wine, 8 Shots of liquor per week    Drug use: Yes     Frequency: 3.0 times per week     Types: Marijuana Berneta Tank)       FAMILY HISTORY:   Family History   Problem Relation Age of Onset    Arthritis Mother     Depression Mother     Miscarriages / [place] Mother     Diabetes Mother     Mental Illness Brother     Cancer Maternal Aunt     Diabetes Maternal Aunt     Asthma Maternal Uncle     Mental Illness Maternal Uncle     Substance Abuse Maternal Uncle     Cancer Maternal Grandmother        MEDICATIONS:     No current facility-administered medications on file prior to encounter. No current outpatient medications on file prior to encounter.         sodium chloride flush  5-40 mL IntraVENous 2 times per day    enoxaparin  40 mg SubCUTAneous Daily    famotidine (PEPCID) injection  20 mg IntraVENous BID    predniSONE  40 mg Oral Daily      sodium chloride lactated ringers Stopped (08/04/22 0510)     diphenhydrAMINE, sodium chloride flush, sodium chloride, ondansetron **OR** ondansetron, polyethylene glycol, acetaminophen **OR** acetaminophen      ALLERGIES:   Allergies as of 08/03/2022    (No Known Allergies)      OBJECTIVE:   height is 5' 7\" (1.702 m) and weight is 195 lb 5.2 oz (88.6 kg). Her temporal temperature is 97.2 °F (36.2 °C). Her blood pressure is 121/83 and her pulse is 73. Her respiration is 18 and oxygen saturation is 100%. I/O this shift:  In: 240 [P.O.:240]  Out: -      PHYSICAL EXAM:    CONSTITUTIONAL: She is a 28y.o.-year-old who appears her stated age. She is alert and oriented x 3 and in no acute distress. HEENT: PERRLA, EOMI. No scleral icterus. Tongue swelling has decreased. Posterior oral pill still swollen but able to see oropharynx much better. Uvula mildly swollen. Swelling seen in bilateral mandibular and submandibular region. No oral lesion appreciated. NECK: Supple, without cervical or supraclavicular lymphadenopathy:  CARDIOVASCULAR: S1 S2 RRR. Without murmer  RESPIRATORY & CHEST: Lungs are clear to auscultation and percussion. No wheezing, no crackles. Good air movement  GASTROINTESTINAL & ABDOMEN: Soft, nontender, positive bowel sounds in all quadrants, non-distended, without hepatosplenomegaly. GENITOURINARY: Deferred. MUSCULOSKELETAL: No tenderness to palpation of the axial skeleton. There is no clubbing. No cyanosis. No edema of the lower extremities. SKIN OF BODY: No rash or jaundice. PSYCHIATRIC EVALUATION: Normal affect. Patient answers questions appropriately. HEMATOLOGIC/LYMPHATIC/ IMMUNOLOGIC: No palpable lymphadenopathy. NEUROLOGIC: Alert and oriented x 3. Groslly non-focal. Motor strength is 5+/5 in all muscle groups. The patient has a normal sensorium globally.       LABS:  Recent Labs     08/03/22  0745 08/04/22  0509   WBC 9.8 13.2*   HGB 14.0 12.9   HCT 42.4 39.8    273   ALT 12  --    AST 22 --    * 134*   K 4.2 4.6    103   CREATININE 0.8 0.7   BUN 8 7   CO2 24 21       Recent Labs     08/03/22  0745 08/04/22  0509   GLUCOSE 120* 100*   CALCIUM 9.5 9.5   * 134*   K 4.2 4.6   CO2 24 21    103   BUN 8 7   CREATININE 0.8 0.7       No results for input(s): PHART, KGF3VWS, PO2ART, OKI3EJW, F1EQLPQH, BEART, S1ZKZRCW in the last 72 hours. No results found for: INR, PROTIME  Lab Results   Component Value Date/Time    AMYLASE 49 10/31/2010 11:25 AM      No results found for: LABA1C  No results found for: EAG  No results found for: TSH, O8DMOIQ, F5PVKPW, THYROIDAB, FT3, T4FREE  No results found for: CKTOTAL, CKMB, CKMBINDEX, TROPONINI   Lab Results   Component Value Date    CRP 8.4 (H) 08/03/2022      No results found for: BNP   No results found for: DDIMER   No results found for: FERRITIN   No results found for: LACTA        IMAGING:        IMPRESSION:     Recurrent angioedema, resolving    RECOMMENDATION:     Patient has had recurrent angioedema with unclear etiology. No clear inciting agent present. She does not have any history of allergies. No family history of angioedema. Possibility of C1 inhibitor deficiency is there. C3 and C4 levels are within normal limits. Other possibilities include undetected autoimmune disease, occult malignancies. Currently not having any suspicious symptomatology to suggest either. She is protecting her airway and maintaining good oxygen saturation on room air. Able to take p.o. diet. Now on prednisone. Can give her a short course of prednisone for another 5 days. Benadryl as needed Pepcid daily. From critical care standpoint she can be discharged back home today. She will need to see the allergist within 7 to 10 days of hospital discharge. Patient verbalized understanding. We will sign off.         Jackelin Cody MD  Pulmonary Critical Care and Sleep Medicine  8/3/2022, 1:37 PM    This note was completed using [employer] recognition software. Grammatical errors, random word insertions, pronoun errors and incomplete sentences are occasional consequences of this technology due to software limitations. If there are questions or concerns about the content of this note of information contained within the body of this dictation they should be addressed with the provider for clarification.

## 2022-08-04 NOTE — FLOWSHEET NOTE
See flow sheets for assessment. VSS. Edema to lips, tongue, neck and cheeks noted- pt states much improved since admission. Able to manage secretions without difficulty. Plan of care discussed.

## 2022-08-04 NOTE — PROGRESS NOTES
Speech Language Pathology  Facility/Department: [facility] ICU  Initial Assessment  DYSPHAGIA BEDSIDE SWALLOW EVALUATION     Patient: Danish Boyd   : 1990   MRN: [id number]      Evaluation Date: 2022   Admitting Diagnosis: Angioedema of lips, initial encounter [T78. 3XXA]  Angioedema, initial encounter [T78. 3XXA]  Pain: Denies                                                       H&P: Billie Bush is a(n) 28 y.o. female who presents with acute throat, tongue, cheek swelling this morning upon waking up. Denies novel foods or medications. She has had multiple similar episodes of this since the beginning of  for which she has been hospitalized for. She states that this episode seemed to be the worst.  She woke up and she was unable to swallow her own saliva. She was on lisinopril for only 2 days at the beginning of  and was taken off of it after noting normotensive blood pressures. Earlier today her throat felt very raw, but this is also significantly improved. Now with only a minor sore throat. Overall she feels like she is approximately 80% better. No known environmental or food allergies. No history of allergy testing in the past.  No history of head or neck surgery. History/Prior Level of Function:   Living Status: Home  Prior Dysphagia History: No prior dysphagia history per chart review or per Pt report  Reason for referral: SLP evaluation orders received due to pt/family reports of trouble swallowing     Dysphagia Impressions/Diagnosis: Oropharyngeal Dysphagia   Pt currently alert and verbally responsive on RA. Pt now reports that oral/lingual swelling appears to have largely resolved. Mild vocal hoarseness noted prior to and throughout po trials. No overt facial asymmetry noted at rest or with completion of OME. With po trials, Pt demonstrates adequate bolus control and A-P propulsion with all textures.  Mild prolonged oral clearing noted with solid textures with mild lingual stasis noted on L body of tongue after initial swallow. However, oral stasis was spontaneously cleared with second swallows and with a thin liquid \"rinse\". Clinical symptoms of timely swallow initiation and adequate laryngeal excursion via palpation noted with all textures. No overt signs of aspiration noted with any texture. Education regarding aspiration risk and precautions explained to the Pt who verbalized understanding. No further dysphagia treatment intervention is indicated at this time. Recommended Diet and Intervention 8/4/2022:  Diet Solids Recommendation:  Regular texture diet  Liquid Consistency Recommendation: Thin liquids  Recommended form of Meds: Whole with water          Compensatory Swallowing Strategies: Alternate solids/liquids , Upright as possible with all PO intake , Swallow 2 times per bite , Lingual Sweeps     SHORT TERM DYSPHAGIA GOALS/PLAN OF CARE: Speech therapy for dysphagia tx No further follow-up indicated     Discharge Recommendations: No further follow-up appears indicated at this time.      Patient Positioning: Upright in bed     Current Diet Level (prior to evaluation): NPO        Respiratory Status:   [x]Room Air   []O2 via nasal cannula   []Other:    Dentition:  [x]Adequate  []Dentures   []Missing Many Teeth  []Edentulous  []Other:    Baseline Vocal Quality:  []Normal  []Dysphonic   []Aphonic   [x]Hoarse  []Wet  []Weak  []Other:    Volitional Swallow:   []Absent   []Delayed     [x]Adequate     []Required use of drink     Oral Mechanism Exam:  [x]WFL []Mild   [] Moderate  []Severe  []To be assessed  Impaired:   []Left side      []Right side    []Labial ROM/Coordination    []Labial Symmetry   []Lingual ROM/Coordination   []Lingual Symmetry  []Gag  []Other:     Oral Phase: []WFL [x]Mild   [] Moderate  []Severe  []To be assessed   [x]Impaired/Prolonged Mastication:   []Oral Holding:   []Spillage Left:   []Spillage Right:  []Pocketing Left:   []Pocketing Right:   []Decreased Anterior to Posterior Transit:   []Suspected Premature Bolus Loss:   [x]Lingual/Palatal Residue: /spontaneously cleared with second swallow    []Other:     Pharyngeal Phase: [x]WFL []Mild   [] Moderate  []Severe  []To be assessed   []Delayed Swallow:   []Suspected Pharyngeal Pooling:   []Decreased Laryngeal Elevation:   []Absent Swallow:  []Wet Vocal Quality:   []Throat Clearing-Immediate:   []Throat Clearing-Delayed:   []Cough-Immediate:   []Cough-Delayed:  []Change in Vital Signs:  []Suspected Delayed Pharyngeal Clearing:  []Other:       Eating Assistance:  [x]Independent  []Setup or clean-up assistance   [] Supervision or touching assistance   [] Partial or moderate assistance   [] Substantial or maximal assistance  [] Dependent       EDUCATION:   Provided education regarding role of SLP, results of assessment, recommendations and general speech pathology plan of care. [x] Pt verbalized understanding and agreement   [] Pt requires ongoing learning   [] No evidence of comprehension     If patient discharges prior to next visit, this note will serve as discharge.      Timed Code Minutes: 0 min  Total Treatment Minutes: 25 min    [place] [facility]#[id number]

## 2022-08-04 NOTE — PROGRESS NOTES
Pt. Discharged to home with friend. Consult order faxed to [facility] group. Pt. Dressed and PIV removed. Belongings gathered. Awaiting home meds. Will take pt to outpatient pharmacy for Epi-Pen instructions.

## 2022-08-05 LAB — ANGIOTENSIN CONVERTING ENZYME: 23 U/L (ref 16–85)

## 2022-08-06 LAB — IGE: 49 KU/L

## 2022-08-07 NOTE — DISCHARGE SUMMARY
Hospital Medicine Discharge Summary    Patient ID: Danish Boyd      Patient's PCP: No primary care provider on file. Admit Date: 8/3/2022     Discharge Date: 8/4/2022     Admitting Physician: Suni Kc MD     Discharge Physician: Homa Bah MD        Active Hospital Problems    Diagnosis     Angioedema of lips, initial encounter [facility]. 3XXA]      Priority: Medium         Hospital Course: This 28 y.o. female without any significant past medical history presented with lips & tongue, swelling and difficulty breathing. Of note, patient was seen at the [facility] ED on 6/2/22 for generalized itching, redness,swelling, hoarseness and difficulty swallowing. Patient symptoms resolved after receiving IV Solu-Medrol, Benadryl and Pepcid and was discharged home on cetirizine, famotidine and EpiPen. Patient was admitted at [facility] on 6/8/22 with lip and tongue swelling and was discharged on Medrol Dosepak, Benadryl and Pepcid and was instructed to make appointment with outpatient allergy testing. Patient did not seek any medical attention after discharge as her symptoms resolved. On admission, patient was hemodynamically stable. Patient was given EpiPen, IV Solu-Medrol and antihistamine in the ED. Recurrent angioedema: Unclear etiology. Improved  Status post epi/IV Solu-Medrol and antihistamine in the ED  C3-C4 WNL, angiotensin-converting enzyme, C1 esterase inhibitor, C1q binding assay pending  Pulmonology ENT were consulted and patient was monitored in the ICU overnight. Patient remained hemodynamically stable. Tongue and lip swelling improved. Patient was discharged home on short course of steroids, Benadryl and famotidine.   Patient was provided with EpiPen on discharge and was instructed to make appointment with allergist, [facility] on discharge          Physical Exam Performed:     /83   Pulse 73   Temp 97.2 °F (36.2 °C) (Temporal)   Resp 18   Ht 5' 7\" (1.702 m) Wt 195 lb 5.2 oz (88.6 kg)   LMP 07/20/2022 (Exact Date)   SpO2 100%   BMI 30.59 kg/m²     General appearance:  No apparent distress, appears stated age and cooperative. Eyes: Sclera clear. Pupils equal.  ENT: Moist oral mucosa. Trachea midline, no adenopathy. Cardiovascular: Regular rhythm, normal S1, S2. No murmur. No edema in lower extremities  Respiratory:Not usingaccessory muscles. Good inspiratory effort. Clear to auscultation bilaterally  GI: Abdomen soft, no tenderness, not distended, normal bowel sounds  Musculoskeletal: Normal ROM, No cyanosis in digits. Neurology: CN 2-12 grossly intact. No speech or motor deficits  Psych: Normal affect. Alert and oriented in time, place and person  Skin: Warm, dry, normal turgor    Consults:     IP CONSULT TO OTOLARYNGOLOGY  IP CONSULT TO HOSPITALIST  IP CONSULT TO PULMONOLOGY  IP CONSULT TO FINANCIAL COUNSELOR  IP CONSULT TO FINANCIAL COUNSELOR    Disposition: Home    Condition at Discharge: Stable     Discharge Instructions/Follow-up:   Follow up with your PCP within 3-4 days of discharge and have PCP follow-up on the lab work-up results   Make appointment with allergist, [facility]orge [facility] on discharge  Return to the ED if he had recurrence of symptoms or worsening swelling  Take all your medications as prescribed. Discharge Medications:     Discharge Medication List as of 8/4/2022  1:24 PM             Details   predniSONE (DELTASONE) 20 MG tablet Take 2 tablets by mouth in the morning for 4 days. , Disp-8 tablet, R-0Normal      cetirizine (ZYRTEC) 10 MG tablet Take 1 tablet by mouth in the morning., Disp-30 tablet, R-0Normal                Details   EPINEPHrine (EPIPEN 2-MORRIS) 0.3 MG/0.3ML SOAJ injection Inject 0.3 mLs into the muscle once for 1 dose Use as directed for allergic reaction, Disp-0.3 mL, R-1Normal      famotidine (PEPCID) 20 MG tablet Take 1 tablet by mouth in the morning and 1 tablet before bedtime. Do all this for 5 days. , Disp-10 tablet, R-1Normal           The patient was seen and examined on day of discharge and this discharge summary is in conjunction with any daily progress note from day of discharge. Time Spent on discharge is 45 minutes  in the examination, evaluation, counseling and review of medications and discharge plan. Note that greater  than 30 minutes was spent in preparing discharge papers, discussing discharge with patient, medication review, etc.     Signed:    Bridgette Noriega MD   8/7/2022      Thank you No primary care provider on file. for the opportunity to be involved in this patient's care. If you have any questions or concerns please feel free to contact me at [phone].

## 2022-08-09 LAB
C1 ESTERASE INHIBITOR FUNCTIONAL ASSAY: 108 %
C1Q BINDING: 2.5 UG EQ/ML (ref 0–3.9)

## 2022-10-09 ENCOUNTER — HOSPITAL ENCOUNTER (EMERGENCY)
Age: 32
Discharge: HOME | End: 2022-10-09
Attending: STUDENT IN AN ORGANIZED HEALTH CARE EDUCATION/TRAINING PROGRAM
Payer: MEDICAID

## 2022-10-09 ENCOUNTER — APPOINTMENT (OUTPATIENT)
Dept: GENERAL RADIOLOGY | Age: 32
End: 2022-10-09
Payer: MEDICAID

## 2022-10-09 VITALS
BODY MASS INDEX: 29.82 KG/M2 | HEIGHT: 67 IN | DIASTOLIC BLOOD PRESSURE: 90 MMHG | WEIGHT: 190 LBS | RESPIRATION RATE: 18 BRPM | TEMPERATURE: 99.3 F | SYSTOLIC BLOOD PRESSURE: 124 MMHG | HEART RATE: 98 BPM | OXYGEN SATURATION: 99 %

## 2022-10-09 DIAGNOSIS — N12 PYELONEPHRITIS: ICD-10-CM

## 2022-10-09 DIAGNOSIS — J02.9 ACUTE PHARYNGITIS, UNSPECIFIED ETIOLOGY: Primary | ICD-10-CM

## 2022-10-09 LAB
ANION GAP SERPL CALCULATED.3IONS-SCNC: 15 MMOL/L (ref 3–16)
BACTERIA: ABNORMAL /HPF
BANDED NEUTROPHILS RELATIVE PERCENT: 1 % (ref 0–7)
BASOPHILS ABSOLUTE: 0 K/UL (ref 0–0.2)
BASOPHILS RELATIVE PERCENT: 0 %
BILIRUBIN URINE: ABNORMAL
BLOOD, URINE: ABNORMAL
BUN BLDV-MCNC: 10 MG/DL (ref 7–20)
CALCIUM SERPL-MCNC: 10.4 MG/DL (ref 8.3–10.6)
CHLORIDE BLD-SCNC: 99 MMOL/L (ref 99–110)
CLARITY: ABNORMAL
CO2: 21 MMOL/L (ref 21–32)
COLOR: ABNORMAL
CREAT SERPL-MCNC: 0.6 MG/DL (ref 0.6–1.1)
EOSINOPHILS ABSOLUTE: 0.2 K/UL (ref 0–0.6)
EOSINOPHILS RELATIVE PERCENT: 1 %
EPITHELIAL CELLS, UA: ABNORMAL /HPF (ref 0–5)
GFR AFRICAN AMERICAN: >60
GFR NON-AFRICAN AMERICAN: >60
GLUCOSE BLD-MCNC: 115 MG/DL (ref 70–99)
GLUCOSE URINE: NEGATIVE MG/DL
HCG(URINE) PREGNANCY TEST: NEGATIVE
HCT VFR BLD CALC: 42.4 % (ref 36–48)
HEMOGLOBIN: 13.6 G/DL (ref 12–16)
KETONES, URINE: ABNORMAL MG/DL
LACTIC ACID: 1.8 MMOL/L (ref 0.4–2)
LEUKOCYTE ESTERASE, URINE: ABNORMAL
LYMPHOCYTES ABSOLUTE: 1.8 K/UL (ref 1–5.1)
LYMPHOCYTES RELATIVE PERCENT: 10 %
MCH RBC QN AUTO: 30.7 PG (ref 26–34)
MCHC RBC AUTO-ENTMCNC: 32.2 G/DL (ref 31–36)
MCV RBC AUTO: 95.5 FL (ref 80–100)
MICROSCOPIC EXAMINATION: YES
MONO TEST: NEGATIVE
MONOCYTES ABSOLUTE: 1.6 K/UL (ref 0–1.3)
MONOCYTES RELATIVE PERCENT: 9 %
NEUTROPHILS ABSOLUTE: 14.6 K/UL (ref 1.7–7.7)
NEUTROPHILS RELATIVE PERCENT: 79 %
NITRITE, URINE: POSITIVE
PDW BLD-RTO: 12.5 % (ref 12.4–15.4)
PH UA: 6 (ref 5–8)
PLATELET # BLD: 249 K/UL (ref 135–450)
PLATELET SLIDE REVIEW: ADEQUATE
PMV BLD AUTO: 8.7 FL (ref 5–10.5)
POTASSIUM REFLEX MAGNESIUM: 4.3 MMOL/L (ref 3.5–5.1)
PROTEIN UA: 100 MG/DL
RAPID INFLUENZA  B AGN: NEGATIVE
RAPID INFLUENZA A AGN: NEGATIVE
RBC # BLD: 4.44 M/UL (ref 4–5.2)
RBC # BLD: NORMAL 10*6/UL
RBC UA: ABNORMAL /HPF (ref 0–4)
S PYO AG THROAT QL: NEGATIVE
SLIDE REVIEW: ABNORMAL
SODIUM BLD-SCNC: 135 MMOL/L (ref 136–145)
SPECIFIC GRAVITY UA: 1.02 (ref 1–1.03)
URINE REFLEX TO CULTURE: YES
URINE TYPE: ABNORMAL
UROBILINOGEN, URINE: >=8 E.U./DL
WBC # BLD: 18.3 K/UL (ref 4–11)
WBC UA: >100 /HPF (ref 0–5)

## 2022-10-09 PROCEDURE — 85025 COMPLETE CBC W/AUTO DIFF WBC: CPT

## 2022-10-09 PROCEDURE — U0005 INFEC AGEN DETEC AMPLI PROBE: HCPCS

## 2022-10-09 PROCEDURE — 87081 CULTURE SCREEN ONLY: CPT

## 2022-10-09 PROCEDURE — U0003 INFECTIOUS AGENT DETECTION BY NUCLEIC ACID (DNA OR RNA); SEVERE ACUTE RESPIRATORY SYNDROME CORONAVIRUS 2 (SARS-COV-2) (CORONAVIRUS DISEASE [COVID-19]), AMPLIFIED PROBE TECHNIQUE, MAKING USE OF HIGH THROUGHPUT TECHNOLOGIES AS DESCRIBED BY CMS-2020-01-R: HCPCS

## 2022-10-09 PROCEDURE — 86308 HETEROPHILE ANTIBODY SCREEN: CPT

## 2022-10-09 PROCEDURE — 6370000000 HC RX 637 (ALT 250 FOR IP): Performed by: STUDENT IN AN ORGANIZED HEALTH CARE EDUCATION/TRAINING PROGRAM

## 2022-10-09 PROCEDURE — 83605 ASSAY OF LACTIC ACID: CPT

## 2022-10-09 PROCEDURE — 99284 EMERGENCY DEPT VISIT MOD MDM: CPT

## 2022-10-09 PROCEDURE — 81001 URINALYSIS AUTO W/SCOPE: CPT

## 2022-10-09 PROCEDURE — 84703 CHORIONIC GONADOTROPIN ASSAY: CPT

## 2022-10-09 PROCEDURE — 87077 CULTURE AEROBIC IDENTIFY: CPT

## 2022-10-09 PROCEDURE — 87880 STREP A ASSAY W/OPTIC: CPT

## 2022-10-09 PROCEDURE — 71046 X-RAY EXAM CHEST 2 VIEWS: CPT

## 2022-10-09 PROCEDURE — 87086 URINE CULTURE/COLONY COUNT: CPT

## 2022-10-09 PROCEDURE — 87804 INFLUENZA ASSAY W/OPTIC: CPT

## 2022-10-09 PROCEDURE — 80048 BASIC METABOLIC PNL TOTAL CA: CPT

## 2022-10-09 PROCEDURE — 87186 SC STD MICRODIL/AGAR DIL: CPT

## 2022-10-09 PROCEDURE — 36415 COLL VENOUS BLD VENIPUNCTURE: CPT

## 2022-10-09 RX ORDER — CEPHALEXIN 250 MG/1
500 CAPSULE ORAL ONCE
Status: COMPLETED | OUTPATIENT
Start: 2022-10-09 | End: 2022-10-09

## 2022-10-09 RX ORDER — CEPHALEXIN 500 MG/1
500 CAPSULE ORAL 2 TIMES DAILY
Qty: 20 CAPSULE | Refills: 0 | Status: SHIPPED | OUTPATIENT
Start: 2022-10-09 | End: 2022-10-19

## 2022-10-09 RX ORDER — ACETAMINOPHEN 500 MG
1000 TABLET ORAL ONCE
Status: COMPLETED | OUTPATIENT
Start: 2022-10-09 | End: 2022-10-09

## 2022-10-09 RX ORDER — ONDANSETRON 4 MG/1
4-8 TABLET, ORALLY DISINTEGRATING ORAL EVERY 12 HOURS PRN
Qty: 12 TABLET | Refills: 0 | Status: SHIPPED | OUTPATIENT
Start: 2022-10-09

## 2022-10-09 RX ADMIN — CEPHALEXIN 500 MG: 250 CAPSULE ORAL at 18:03

## 2022-10-09 RX ADMIN — ACETAMINOPHEN 1000 MG: 500 TABLET ORAL at 15:36

## 2022-10-09 ASSESSMENT — PAIN SCALES - GENERAL
PAINLEVEL_OUTOF10: 5
PAINLEVEL_OUTOF10: 7
PAINLEVEL_OUTOF10: 7

## 2022-10-09 ASSESSMENT — PAIN DESCRIPTION - ONSET: ONSET: ON-GOING

## 2022-10-09 ASSESSMENT — PAIN DESCRIPTION - ORIENTATION: ORIENTATION: RIGHT;LEFT;LOWER;UPPER

## 2022-10-09 ASSESSMENT — ENCOUNTER SYMPTOMS
COUGH: 0
NAUSEA: 1
SINUS PRESSURE: 0
PHOTOPHOBIA: 0
SHORTNESS OF BREATH: 0
SORE THROAT: 1
ABDOMINAL PAIN: 1

## 2022-10-09 ASSESSMENT — PAIN - FUNCTIONAL ASSESSMENT
PAIN_FUNCTIONAL_ASSESSMENT: PREVENTS OR INTERFERES SOME ACTIVE ACTIVITIES AND ADLS
PAIN_FUNCTIONAL_ASSESSMENT: 0-10

## 2022-10-09 ASSESSMENT — PAIN DESCRIPTION - FREQUENCY: FREQUENCY: CONTINUOUS

## 2022-10-09 ASSESSMENT — PAIN DESCRIPTION - LOCATION
LOCATION: ABDOMEN
LOCATION: ABDOMEN

## 2022-10-09 ASSESSMENT — PAIN DESCRIPTION - PAIN TYPE: TYPE: ACUTE PAIN

## 2022-10-09 ASSESSMENT — PAIN DESCRIPTION - DESCRIPTORS: DESCRIPTORS: ACHING;CRAMPING

## 2022-10-09 NOTE — ED PROVIDER NOTES
[address] [facility]      Pt Name: Subhash Meyers  MRN: [id number]  [place] 1990  Date of evaluation: 10/9/2022  Provider: Jacqueline Pablo MD    CHIEF COMPLAINT       Chief Complaint   Patient presents with    Abdominal Pain     Pt came to the ER febrile, N/V, abdominal pain, chills, sore throat, tachycardia, symptoms for the past 48 hours, OTC medications not working to relieve symptoms. Pharyngitis         HISTORY OF PRESENT ILLNESS   (Location/Symptom, Timing/Onset, Context/Setting, Quality, Duration, Modifying Factors, Severity)  Note limiting factors. Subhash Meyers is a 28 y.o. female who presents to the emergency department with sore throat, body aches, fevers and chills, diffuse abdominal pain with diarrhea and nausea. Symptoms present for 2 days. She was exposed to other people during trunk retreat about 2 days ago when her symptoms started. She is endorsing pain with swallowing. No trismus or voice change. No trouble controlling secretions. Over-the-counter pain medication has not helped. She has not received COVID-vaccine or flu vaccine. HPI    Nursing Notes were reviewed. REVIEW OF SYSTEMS    (2-9 systems for level 4, 10 or more for level 5)     Review of Systems   Constitutional:  Positive for chills and fever. HENT:  Positive for congestion and sore throat. Negative for sinus pressure. Eyes:  Negative for photophobia and visual disturbance. Respiratory:  Negative for cough and shortness of breath. Cardiovascular:  Negative for chest pain and leg swelling. Gastrointestinal:  Positive for abdominal pain and nausea. Genitourinary:  Negative for vaginal discharge and vaginal pain. Musculoskeletal:  Positive for myalgias. Negative for arthralgias and neck stiffness. Skin:  Negative for rash and wound. Neurological:  Positive for headaches. Negative for dizziness.    Psychiatric/Behavioral:  Negative for agitation and confusion. Except as noted above the remainder of the review of systems was reviewed and negative. PAST MEDICAL HISTORY     Past Medical History:   Diagnosis Date    Abnormal Pap smear 2011    LEEP done         SURGICAL HISTORY     History reviewed. No pertinent surgical history. CURRENT MEDICATIONS       Previous Medications    EPINEPHRINE (EPIPEN 2-MORRIS) 0.3 MG/0.3ML SOAJ INJECTION    Inject 0.3 mLs into the muscle once for 1 dose Use as directed for allergic reaction    FAMOTIDINE (PEPCID) 20 MG TABLET    Take 1 tablet by mouth in the morning and 1 tablet before bedtime. Do all this for 5 days. ALLERGIES     Ace inhibitors    FAMILY HISTORY       Family History   Problem Relation Age of Onset    Arthritis Mother     Depression Mother     Miscarriages / [place] Mother     Diabetes Mother     Mental Illness Brother     Cancer Maternal Aunt     Diabetes Maternal Aunt     Asthma Maternal Uncle     Mental Illness Maternal Uncle     Substance Abuse Maternal Uncle     Cancer Maternal Grandmother           SOCIAL HISTORY       Social History     Socioeconomic History    Marital status: Single     Spouse name: None    Number of children: 2    Years of education: None    Highest education level: None   Occupational History    Occupation: [employer] at [employer]   Tobacco Use    Smoking status: Every Day     Packs/day: 0.25     Years: 9.00     Pack years: 2.25     Types: Cigars, Cigarettes    Smokeless tobacco: Never   Vaping Use    Vaping Use: Former    Substances: Flavoring, does not use currently    Devices: Pre-filled or refillable cartridge, does not use currently   Substance and Sexual Activity    Alcohol use:  Yes     Alcohol/week: 12.0 standard drinks     Types: 4 Glasses of wine, 8 Shots of liquor per week    Drug use: Yes     Frequency: 3.0 times per week     Types: Marijuana [place])    Sexual activity: Yes     Partners: Male       SCREENINGS        [place] Coma Scale  Eye Opening: Spontaneous  Best Verbal Response: Oriented  Best Motor Response: Obeys commands  [place] Coma Scale Score: 15               PHYSICAL EXAM    (up to 7 for level 4, 8 or more for level 5)     ED Triage Vitals   BP Temp Temp src Pulse Resp SpO2 Height Weight   -- -- -- -- -- -- -- --       Physical Exam  Constitutional:       Appearance: Normal appearance. HENT:      Head: Normocephalic and atraumatic. Mouth/Throat:      Comments: Posterior oral erythema with bilateral tonsillar exudates and tonsillar hypertrophy. No unilateral tonsillar mass or uvular deviation. No stridor. No trismus. No difficulty with tongue elevation. Eyes:      Conjunctiva/sclera: Conjunctivae normal.   Cardiovascular:      Rate and Rhythm: Regular rhythm. Tachycardia present. Pulses: Normal pulses. Heart sounds: Normal heart sounds. Pulmonary:      Effort: Pulmonary effort is normal.      Breath sounds: Normal breath sounds. Abdominal:      General: Abdomen is flat. There is no distension. Palpations: Abdomen is soft. There is no mass. Tenderness: There is no abdominal tenderness. Musculoskeletal:      Cervical back: Normal range of motion. No rigidity. Skin:     General: Skin is warm and dry. Capillary Refill: Capillary refill takes less than 2 seconds. Neurological:      Mental Status: She is alert and oriented to person, place, and time. Comments: Speech clear. No facial drooping. Extraocular movements intact. Moves all 4 extremities to command without difficulty. No nuchal rigidity.    Psychiatric:         Mood and Affect: Mood normal.         Behavior: Behavior normal.       DIAGNOSTIC RESULTS       RADIOLOGY:   Non-plain film images such as CT, Ultrasound and MRI are read by the radiologist. Plain radiographic images are visualized and preliminarily interpreted by the emergency physician with the below findings:      Interpretation per the Radiologist below, if available at the time of this note:    XR CHEST (2 VW)   Final Result   No acute cardiopulmonary process. LABS:  Labs Reviewed   BASIC METABOLIC PANEL W/ REFLEX TO MG FOR LOW K - Abnormal; Notable for the following components:       Result Value    Sodium 135 (*)     Glucose 115 (*)     All other components within normal limits   CBC WITH AUTO DIFFERENTIAL - Abnormal; Notable for the following components:    WBC 18.3 (*)     Neutrophils Absolute 14.6 (*)     Monocytes Absolute 1.6 (*)     All other components within normal limits   URINALYSIS WITH REFLEX TO CULTURE - Abnormal; Notable for the following components:    Color, UA DARK YELLOW (*)     Clarity, UA TURBID (*)     Bilirubin Urine MODERATE (*)     Ketones, Urine TRACE (*)     Blood, Urine TRACE (*)     Protein,  (*)     Urobilinogen, Urine >=8.0 (*)     Nitrite, Urine POSITIVE (*)     Leukocyte Esterase, Urine MODERATE (*)     All other components within normal limits   MICROSCOPIC URINALYSIS - Abnormal; Notable for the following components:    WBC, UA >100 (*)     Bacteria, UA 4+ (*)     All other components within normal limits   RAPID INFLUENZA A/B ANTIGENS   STREP SCREEN GROUP A THROAT   CULTURE, BETA STREP CONFIRM PLATES   CULTURE, URINE   MONONUCLEOSIS SCREEN   LACTIC ACID   PREGNANCY, URINE   COVID-19       All other labs were within normal range or not returned as of this dictation. EMERGENCY DEPARTMENT COURSE and DIFFERENTIAL DIAGNOSIS/MDM:   Vitals:    Vitals:    10/09/22 1501 10/09/22 1744 10/09/22 1751   BP: 130/87 (!) 124/90    Pulse: (!) 116 99 98   Resp: 20 18    Temp: 100.4 °F (38 °C) 99.3 °F (37.4 °C)    TempSrc: Oral Oral    SpO2: 99% 99%    Weight: 190 lb (86.2 kg)     Height: 5' 7\" (1.702 m)       Vitals:    10/09/22 1751   BP:    Pulse: 98   Resp:    Temp:    SpO2:        Is this patient to be included in the SEP-1 Core Measure due to severe sepsis or septic shock?    No   Exclusion criteria - the patient is NOT to be included for SEP-1 Core Measure due to: Alternative explanation for abnormal labs/vitals that do not relate to sepsis, see MDM for further explanation      Medications   acetaminophen (TYLENOL) tablet 1,000 mg (1,000 mg Oral Given 10/9/22 1536)   cephALEXin (KEFLEX) capsule 500 mg (500 mg Oral Given 10/9/22 1803)     Course and MDM:  Patient presents for evaluation of sore throat, myalgias, fever and chills x2 days. On arrival she was cardiac and febrile. She appears well, nontoxic and well-hydrated. She has a nonfocal neurologic exam and no meningismus to suggest acute meningitis/encephalitis. She does have tonsillar exudates and with lack of cough and fever, antibiotic treatment is warranted regardless of strep swab. COVID and flu sent, Isabelle Metz pending but flu has resulted as negative. She was treated with oral Tylenol. Upon reevaluation as of 6 PM her vital signs have normalized. I do not believe she is septic and aggressive IV fluids are not warranted currently as she is tolerating p.o.  UTI noted, will treat for presumed pyelonephritis. She has a soft, nontender benign abdominal exam otherwise and I have low suspicion for acute appendicitis or cholecystitis, bowel obstruction or perforation, ovarian torsion or PID. I reviewed her prior urine cultures, she has grown pansensitive E. coli in the past.  We will treat with Keflex today to cover urine as well as possible strep pharyngitis. She received first dose of antibiotics here. Strict ER return precautions given including lack of improvement in 48 hours, persistent fever, worsening abdominal pain or confusion or inability to keep down fluids. She is agreeable to plan. PROCEDURES:  Unless otherwise noted below, none     Procedures        FINAL IMPRESSION      1. Acute pharyngitis, unspecified etiology    2.  Pyelonephritis          DISPOSITION/PLAN   DISPOSITION Decision To Discharge 10/09/2022 05:54:57 PM      PATIENT REFERRED TO:  your family physician    In 1 week      DISCHARGE MEDICATIONS:      New Prescriptions    CEPHALEXIN (KEFLEX) 500 MG CAPSULE    Take 1 capsule by mouth 2 times daily for 10 days    ONDANSETRON (ZOFRAN ODT) 4 MG DISINTEGRATING TABLET    Take 1-2 tablets by mouth every 12 hours as needed for Nausea May Sub regular tablet (non-ODT) if insurance does not cover ODT. Controlled Substances Monitoring:    If the patient was prescribed a controlled substance today, the PDMP was reviewed as documented below. No flowsheet data found.     (Please note that portions of this note were completed with a voice recognition program.  Efforts were made to edit the dictations but occasionally words are mis-transcribed.)    Maisha Márquez MD (electronically signed)  Attending Emergency Physician           Janae Scherer MD  10/09/22 1935

## 2022-10-10 LAB
ORGANISM: ABNORMAL
S PYO THROAT QL CULT: ABNORMAL
S PYO THROAT QL CULT: ABNORMAL
SARS-COV-2: NOT DETECTED

## 2022-10-11 LAB
ORGANISM: ABNORMAL
URINE CULTURE, ROUTINE: ABNORMAL